# Patient Record
Sex: MALE | Race: WHITE | NOT HISPANIC OR LATINO | Employment: OTHER | URBAN - METROPOLITAN AREA
[De-identification: names, ages, dates, MRNs, and addresses within clinical notes are randomized per-mention and may not be internally consistent; named-entity substitution may affect disease eponyms.]

---

## 2023-02-24 ENCOUNTER — HOSPITAL ENCOUNTER (INPATIENT)
Facility: MEDICAL CENTER | Age: 68
LOS: 3 days | DRG: 247 | End: 2023-02-27
Attending: INTERNAL MEDICINE | Admitting: STUDENT IN AN ORGANIZED HEALTH CARE EDUCATION/TRAINING PROGRAM
Payer: MEDICARE

## 2023-02-24 ENCOUNTER — HOSPITAL ENCOUNTER (EMERGENCY)
Facility: MEDICAL CENTER | Age: 68
End: 2023-02-24
Attending: EMERGENCY MEDICINE
Payer: MEDICARE

## 2023-02-24 ENCOUNTER — APPOINTMENT (OUTPATIENT)
Dept: RADIOLOGY | Facility: MEDICAL CENTER | Age: 68
End: 2023-02-24
Attending: EMERGENCY MEDICINE
Payer: MEDICARE

## 2023-02-24 VITALS
SYSTOLIC BLOOD PRESSURE: 136 MMHG | BODY MASS INDEX: 31.18 KG/M2 | TEMPERATURE: 97.7 F | HEART RATE: 66 BPM | HEIGHT: 70 IN | OXYGEN SATURATION: 93 % | WEIGHT: 217.81 LBS | RESPIRATION RATE: 18 BRPM | DIASTOLIC BLOOD PRESSURE: 79 MMHG

## 2023-02-24 DIAGNOSIS — I21.4 NSTEMI (NON-ST ELEVATED MYOCARDIAL INFARCTION) (HCC): ICD-10-CM

## 2023-02-24 PROBLEM — I24.9 ACS (ACUTE CORONARY SYNDROME) (HCC): Status: ACTIVE | Noted: 2023-02-24

## 2023-02-24 LAB
ALBUMIN SERPL BCP-MCNC: 4.6 G/DL (ref 3.2–4.9)
ALBUMIN/GLOB SERPL: 1.8 G/DL
ALP SERPL-CCNC: 51 U/L (ref 30–99)
ALT SERPL-CCNC: 35 U/L (ref 2–50)
ANION GAP SERPL CALC-SCNC: 13 MMOL/L (ref 7–16)
APTT PPP: 35.7 SEC (ref 24.7–36)
AST SERPL-CCNC: 33 U/L (ref 12–45)
BASOPHILS # BLD AUTO: 0.6 % (ref 0–1.8)
BASOPHILS # BLD: 0.05 K/UL (ref 0–0.12)
BILIRUB SERPL-MCNC: 0.7 MG/DL (ref 0.1–1.5)
BUN SERPL-MCNC: 22 MG/DL (ref 8–22)
CALCIUM ALBUM COR SERPL-MCNC: 8.9 MG/DL (ref 8.5–10.5)
CALCIUM SERPL-MCNC: 9.4 MG/DL (ref 8.4–10.2)
CHLORIDE SERPL-SCNC: 101 MMOL/L (ref 96–112)
CO2 SERPL-SCNC: 25 MMOL/L (ref 20–33)
CREAT SERPL-MCNC: 0.93 MG/DL (ref 0.5–1.4)
D DIMER PPP IA.FEU-MCNC: <0.27 UG/ML (FEU) (ref 0–0.5)
EKG IMPRESSION: NORMAL
EOSINOPHIL # BLD AUTO: 0.03 K/UL (ref 0–0.51)
EOSINOPHIL NFR BLD: 0.4 % (ref 0–6.9)
ERYTHROCYTE [DISTWIDTH] IN BLOOD BY AUTOMATED COUNT: 42.6 FL (ref 35.9–50)
GFR SERPLBLD CREATININE-BSD FMLA CKD-EPI: 89 ML/MIN/1.73 M 2
GLOBULIN SER CALC-MCNC: 2.6 G/DL (ref 1.9–3.5)
GLUCOSE SERPL-MCNC: 115 MG/DL (ref 65–99)
HCT VFR BLD AUTO: 47.2 % (ref 42–52)
HGB BLD-MCNC: 16.5 G/DL (ref 14–18)
IMM GRANULOCYTES # BLD AUTO: 0.02 K/UL (ref 0–0.11)
IMM GRANULOCYTES NFR BLD AUTO: 0.2 % (ref 0–0.9)
INR PPP: 2.01 (ref 0.87–1.13)
LACTATE SERPL-SCNC: 1.3 MMOL/L (ref 0.5–2)
LYMPHOCYTES # BLD AUTO: 1.82 K/UL (ref 1–4.8)
LYMPHOCYTES NFR BLD: 21.4 % (ref 22–41)
MCH RBC QN AUTO: 32.4 PG (ref 27–33)
MCHC RBC AUTO-ENTMCNC: 35 G/DL (ref 33.7–35.3)
MCV RBC AUTO: 92.5 FL (ref 81.4–97.8)
MONOCYTES # BLD AUTO: 0.46 K/UL (ref 0–0.85)
MONOCYTES NFR BLD AUTO: 5.4 % (ref 0–13.4)
NEUTROPHILS # BLD AUTO: 6.11 K/UL (ref 1.82–7.42)
NEUTROPHILS NFR BLD: 72 % (ref 44–72)
NRBC # BLD AUTO: 0 K/UL
NRBC BLD-RTO: 0 /100 WBC
PLATELET # BLD AUTO: 213 K/UL (ref 164–446)
PMV BLD AUTO: 11.4 FL (ref 9–12.9)
POTASSIUM SERPL-SCNC: 4 MMOL/L (ref 3.6–5.5)
PROT SERPL-MCNC: 7.2 G/DL (ref 6–8.2)
PROTHROMBIN TIME: 22.3 SEC (ref 12–14.6)
RBC # BLD AUTO: 5.1 M/UL (ref 4.7–6.1)
SODIUM SERPL-SCNC: 139 MMOL/L (ref 135–145)
TROPONIN T SERPL-MCNC: 150 NG/L (ref 6–19)
WBC # BLD AUTO: 8.5 K/UL (ref 4.8–10.8)

## 2023-02-24 PROCEDURE — 93005 ELECTROCARDIOGRAM TRACING: CPT | Performed by: EMERGENCY MEDICINE

## 2023-02-24 PROCEDURE — 700102 HCHG RX REV CODE 250 W/ 637 OVERRIDE(OP): Performed by: EMERGENCY MEDICINE

## 2023-02-24 PROCEDURE — 85610 PROTHROMBIN TIME: CPT

## 2023-02-24 PROCEDURE — 770020 HCHG ROOM/CARE - TELE (206)

## 2023-02-24 PROCEDURE — A9270 NON-COVERED ITEM OR SERVICE: HCPCS | Performed by: EMERGENCY MEDICINE

## 2023-02-24 PROCEDURE — 99285 EMERGENCY DEPT VISIT HI MDM: CPT

## 2023-02-24 PROCEDURE — 85379 FIBRIN DEGRADATION QUANT: CPT

## 2023-02-24 PROCEDURE — 85025 COMPLETE CBC W/AUTO DIFF WBC: CPT

## 2023-02-24 PROCEDURE — 85730 THROMBOPLASTIN TIME PARTIAL: CPT

## 2023-02-24 PROCEDURE — 71045 X-RAY EXAM CHEST 1 VIEW: CPT

## 2023-02-24 PROCEDURE — 83605 ASSAY OF LACTIC ACID: CPT

## 2023-02-24 PROCEDURE — 80053 COMPREHEN METABOLIC PANEL: CPT

## 2023-02-24 PROCEDURE — 84484 ASSAY OF TROPONIN QUANT: CPT

## 2023-02-24 PROCEDURE — 36415 COLL VENOUS BLD VENIPUNCTURE: CPT

## 2023-02-24 PROCEDURE — 93005 ELECTROCARDIOGRAM TRACING: CPT

## 2023-02-24 RX ORDER — LISINOPRIL AND HYDROCHLOROTHIAZIDE 20; 12.5 MG/1; MG/1
1 TABLET ORAL DAILY
Status: ON HOLD | COMMUNITY
End: 2023-02-27

## 2023-02-24 RX ORDER — ASPIRIN 81 MG/1
324 TABLET, CHEWABLE ORAL ONCE
Status: COMPLETED | OUTPATIENT
Start: 2023-02-24 | End: 2023-02-24

## 2023-02-24 RX ADMIN — ASPIRIN 81 MG 324 MG: 81 TABLET ORAL at 19:06

## 2023-02-24 ASSESSMENT — COGNITIVE AND FUNCTIONAL STATUS - GENERAL
DAILY ACTIVITIY SCORE: 24
SUGGESTED CMS G CODE MODIFIER DAILY ACTIVITY: CH
MOBILITY SCORE: 24
SUGGESTED CMS G CODE MODIFIER MOBILITY: CH

## 2023-02-24 ASSESSMENT — LIFESTYLE VARIABLES
EVER FELT BAD OR GUILTY ABOUT YOUR DRINKING: NO
DOES PATIENT WANT TO STOP DRINKING: NO
HOW MANY TIMES IN THE PAST YEAR HAVE YOU HAD 5 OR MORE DRINKS IN A DAY: 0
ALCOHOL_USE: NO
TOTAL SCORE: 0
AVERAGE NUMBER OF DAYS PER WEEK YOU HAVE A DRINK CONTAINING ALCOHOL: 1
TOTAL SCORE: 0
EVER HAD A DRINK FIRST THING IN THE MORNING TO STEADY YOUR NERVES TO GET RID OF A HANGOVER: NO
CONSUMPTION TOTAL: NEGATIVE
TOTAL SCORE: 0
HAVE YOU EVER FELT YOU SHOULD CUT DOWN ON YOUR DRINKING: NO
HAVE PEOPLE ANNOYED YOU BY CRITICIZING YOUR DRINKING: NO
ON A TYPICAL DAY WHEN YOU DRINK ALCOHOL HOW MANY DRINKS DO YOU HAVE: 1

## 2023-02-24 ASSESSMENT — PATIENT HEALTH QUESTIONNAIRE - PHQ9
2. FEELING DOWN, DEPRESSED, IRRITABLE, OR HOPELESS: NOT AT ALL
SUM OF ALL RESPONSES TO PHQ9 QUESTIONS 1 AND 2: 0
1. LITTLE INTEREST OR PLEASURE IN DOING THINGS: NOT AT ALL

## 2023-02-24 ASSESSMENT — FIBROSIS 4 INDEX
FIB4 SCORE: 1.81
FIB4 SCORE: 1.81

## 2023-02-25 ENCOUNTER — APPOINTMENT (OUTPATIENT)
Dept: CARDIOLOGY | Facility: MEDICAL CENTER | Age: 68
DRG: 247 | End: 2023-02-25
Attending: NURSE PRACTITIONER
Payer: MEDICARE

## 2023-02-25 ENCOUNTER — APPOINTMENT (OUTPATIENT)
Dept: CARDIOLOGY | Facility: MEDICAL CENTER | Age: 68
DRG: 247 | End: 2023-02-25
Attending: INTERNAL MEDICINE
Payer: MEDICARE

## 2023-02-25 PROBLEM — I10 PRIMARY HYPERTENSION: Status: ACTIVE | Noted: 2023-02-25

## 2023-02-25 PROBLEM — Z86.711 HISTORY OF PULMONARY EMBOLISM: Status: ACTIVE | Noted: 2023-02-25

## 2023-02-25 PROBLEM — I21.4 NON-STEMI (NON-ST ELEVATED MYOCARDIAL INFARCTION) (HCC): Status: ACTIVE | Noted: 2023-02-24

## 2023-02-25 PROBLEM — I21.4 NSTEMI (NON-ST ELEVATED MYOCARDIAL INFARCTION) (HCC): Status: ACTIVE | Noted: 2023-02-25

## 2023-02-25 PROBLEM — C20 RECTAL CARCINOMA (HCC): Status: ACTIVE | Noted: 2023-02-25

## 2023-02-25 PROBLEM — E66.01 MORBID OBESITY (HCC): Status: ACTIVE | Noted: 2023-02-25

## 2023-02-25 PROBLEM — E11.9 TYPE 2 DIABETES MELLITUS (HCC): Status: ACTIVE | Noted: 2023-02-25

## 2023-02-25 PROBLEM — G47.33 OSA (OBSTRUCTIVE SLEEP APNEA): Status: ACTIVE | Noted: 2023-02-25

## 2023-02-25 LAB
ACT BLD: 239 SEC (ref 74–137)
ACT BLD: 251 SEC (ref 74–137)
ACT BLD: 257 SEC (ref 74–137)
APTT PPP: 32.4 SEC (ref 24.7–36)
EKG IMPRESSION: NORMAL
INR PPP: 1.32 (ref 0.87–1.13)
LV EJECT FRACT  99904: 60
LV EJECT FRACT MOD 2C 99903: 63.59
LV EJECT FRACT MOD 4C 99902: 54.66
LV EJECT FRACT MOD BP 99901: 58.75
PROTHROMBIN TIME: 16.2 SEC (ref 12–14.6)
TROPONIN T SERPL-MCNC: 442 NG/L (ref 6–19)
UFH PPP CHRO-ACNC: 1.05 IU/ML

## 2023-02-25 PROCEDURE — 93308 TTE F-UP OR LMTD: CPT

## 2023-02-25 PROCEDURE — 700105 HCHG RX REV CODE 258: Performed by: INTERNAL MEDICINE

## 2023-02-25 PROCEDURE — 85520 HEPARIN ASSAY: CPT

## 2023-02-25 PROCEDURE — 93005 ELECTROCARDIOGRAM TRACING: CPT | Performed by: STUDENT IN AN ORGANIZED HEALTH CARE EDUCATION/TRAINING PROGRAM

## 2023-02-25 PROCEDURE — 92978 ENDOLUMINL IVUS OCT C 1ST: CPT | Mod: 26,LD | Performed by: INTERNAL MEDICINE

## 2023-02-25 PROCEDURE — 700102 HCHG RX REV CODE 250 W/ 637 OVERRIDE(OP)

## 2023-02-25 PROCEDURE — 700101 HCHG RX REV CODE 250

## 2023-02-25 PROCEDURE — 85347 COAGULATION TIME ACTIVATED: CPT | Mod: 91

## 2023-02-25 PROCEDURE — A9270 NON-COVERED ITEM OR SERVICE: HCPCS | Performed by: STUDENT IN AN ORGANIZED HEALTH CARE EDUCATION/TRAINING PROGRAM

## 2023-02-25 PROCEDURE — B2111ZZ FLUOROSCOPY OF MULTIPLE CORONARY ARTERIES USING LOW OSMOLAR CONTRAST: ICD-10-PCS | Performed by: INTERNAL MEDICINE

## 2023-02-25 PROCEDURE — 93306 TTE W/DOPPLER COMPLETE: CPT | Mod: 26 | Performed by: INTERNAL MEDICINE

## 2023-02-25 PROCEDURE — 99153 MOD SED SAME PHYS/QHP EA: CPT

## 2023-02-25 PROCEDURE — 700105 HCHG RX REV CODE 258: Performed by: NURSE PRACTITIONER

## 2023-02-25 PROCEDURE — 700111 HCHG RX REV CODE 636 W/ 250 OVERRIDE (IP)

## 2023-02-25 PROCEDURE — 93306 TTE W/DOPPLER COMPLETE: CPT

## 2023-02-25 PROCEDURE — 93308 TTE F-UP OR LMTD: CPT | Mod: 26,59 | Performed by: INTERNAL MEDICINE

## 2023-02-25 PROCEDURE — 92928 PRQ TCAT PLMT NTRAC ST 1 LES: CPT | Mod: LD | Performed by: INTERNAL MEDICINE

## 2023-02-25 PROCEDURE — 84484 ASSAY OF TROPONIN QUANT: CPT

## 2023-02-25 PROCEDURE — 85730 THROMBOPLASTIN TIME PARTIAL: CPT

## 2023-02-25 PROCEDURE — 700102 HCHG RX REV CODE 250 W/ 637 OVERRIDE(OP): Performed by: STUDENT IN AN ORGANIZED HEALTH CARE EDUCATION/TRAINING PROGRAM

## 2023-02-25 PROCEDURE — A9270 NON-COVERED ITEM OR SERVICE: HCPCS

## 2023-02-25 PROCEDURE — 99223 1ST HOSP IP/OBS HIGH 75: CPT | Mod: 25 | Performed by: INTERNAL MEDICINE

## 2023-02-25 PROCEDURE — 99152 MOD SED SAME PHYS/QHP 5/>YRS: CPT | Performed by: INTERNAL MEDICINE

## 2023-02-25 PROCEDURE — 93308 TTE F-UP OR LMTD: CPT | Mod: 26,77,59 | Performed by: INTERNAL MEDICINE

## 2023-02-25 PROCEDURE — 770022 HCHG ROOM/CARE - ICU (200)

## 2023-02-25 PROCEDURE — 99291 CRITICAL CARE FIRST HOUR: CPT | Performed by: INTERNAL MEDICINE

## 2023-02-25 PROCEDURE — 99222 1ST HOSP IP/OBS MODERATE 55: CPT | Mod: AI | Performed by: STUDENT IN AN ORGANIZED HEALTH CARE EDUCATION/TRAINING PROGRAM

## 2023-02-25 PROCEDURE — 027034Z DILATION OF CORONARY ARTERY, ONE ARTERY WITH DRUG-ELUTING INTRALUMINAL DEVICE, PERCUTANEOUS APPROACH: ICD-10-PCS | Performed by: INTERNAL MEDICINE

## 2023-02-25 PROCEDURE — 36415 COLL VENOUS BLD VENIPUNCTURE: CPT

## 2023-02-25 PROCEDURE — 700117 HCHG RX CONTRAST REV CODE 255: Performed by: INTERNAL MEDICINE

## 2023-02-25 PROCEDURE — 4A023N7 MEASUREMENT OF CARDIAC SAMPLING AND PRESSURE, LEFT HEART, PERCUTANEOUS APPROACH: ICD-10-PCS | Performed by: INTERNAL MEDICINE

## 2023-02-25 PROCEDURE — 85610 PROTHROMBIN TIME: CPT

## 2023-02-25 PROCEDURE — 93458 L HRT ARTERY/VENTRICLE ANGIO: CPT | Mod: 26,59 | Performed by: INTERNAL MEDICINE

## 2023-02-25 PROCEDURE — 93010 ELECTROCARDIOGRAM REPORT: CPT | Mod: 59 | Performed by: INTERNAL MEDICINE

## 2023-02-25 PROCEDURE — 93005 ELECTROCARDIOGRAM TRACING: CPT | Performed by: INTERNAL MEDICINE

## 2023-02-25 RX ORDER — LIDOCAINE HYDROCHLORIDE 20 MG/ML
INJECTION, SOLUTION INFILTRATION; PERINEURAL
Status: COMPLETED
Start: 2023-02-25 | End: 2023-02-25

## 2023-02-25 RX ORDER — HEPARIN SODIUM 1000 [USP'U]/ML
INJECTION, SOLUTION INTRAVENOUS; SUBCUTANEOUS
Status: COMPLETED
Start: 2023-02-25 | End: 2023-02-25

## 2023-02-25 RX ORDER — ASPIRIN 81 MG/1
81 TABLET, CHEWABLE ORAL DAILY
Status: DISCONTINUED | OUTPATIENT
Start: 2023-02-25 | End: 2023-02-26

## 2023-02-25 RX ORDER — ATORVASTATIN CALCIUM 80 MG/1
80 TABLET, FILM COATED ORAL EVERY EVENING
Status: DISCONTINUED | OUTPATIENT
Start: 2023-02-25 | End: 2023-02-27 | Stop reason: HOSPADM

## 2023-02-25 RX ORDER — SODIUM CHLORIDE 9 MG/ML
INJECTION, SOLUTION INTRAVENOUS CONTINUOUS
Status: ACTIVE | OUTPATIENT
Start: 2023-02-25 | End: 2023-02-26

## 2023-02-25 RX ORDER — SODIUM CHLORIDE 9 MG/ML
INJECTION, SOLUTION INTRAVENOUS CONTINUOUS
Status: DISCONTINUED | OUTPATIENT
Start: 2023-02-25 | End: 2023-02-25

## 2023-02-25 RX ORDER — ACETAMINOPHEN 325 MG/1
650 TABLET ORAL EVERY 6 HOURS PRN
Status: DISCONTINUED | OUTPATIENT
Start: 2023-02-25 | End: 2023-02-27 | Stop reason: HOSPADM

## 2023-02-25 RX ORDER — MIDAZOLAM HYDROCHLORIDE 1 MG/ML
INJECTION INTRAMUSCULAR; INTRAVENOUS
Status: COMPLETED
Start: 2023-02-25 | End: 2023-02-25

## 2023-02-25 RX ORDER — PRASUGREL 10 MG/1
TABLET, FILM COATED ORAL
Status: COMPLETED
Start: 2023-02-25 | End: 2023-02-25

## 2023-02-25 RX ORDER — PRASUGREL 10 MG/1
60 TABLET, FILM COATED ORAL ONCE
Status: DISCONTINUED | OUTPATIENT
Start: 2023-02-25 | End: 2023-02-25

## 2023-02-25 RX ORDER — VERAPAMIL HYDROCHLORIDE 2.5 MG/ML
INJECTION, SOLUTION INTRAVENOUS
Status: COMPLETED
Start: 2023-02-25 | End: 2023-02-25

## 2023-02-25 RX ORDER — PRASUGREL 10 MG/1
10 TABLET, FILM COATED ORAL DAILY
Status: DISCONTINUED | OUTPATIENT
Start: 2023-02-26 | End: 2023-02-26

## 2023-02-25 RX ORDER — NOREPINEPHRINE BITARTRATE 1 MG/ML
INJECTION, SOLUTION INTRAVENOUS
Status: DISPENSED
Start: 2023-02-25 | End: 2023-02-26

## 2023-02-25 RX ORDER — HEPARIN SODIUM 200 [USP'U]/100ML
INJECTION, SOLUTION INTRAVENOUS
Status: COMPLETED
Start: 2023-02-25 | End: 2023-02-25

## 2023-02-25 RX ADMIN — HEPARIN SODIUM 2000 UNITS: 200 INJECTION, SOLUTION INTRAVENOUS at 15:02

## 2023-02-25 RX ADMIN — HEPARIN SODIUM: 1000 INJECTION, SOLUTION INTRAVENOUS; SUBCUTANEOUS at 14:40

## 2023-02-25 RX ADMIN — SODIUM CHLORIDE: 9 INJECTION, SOLUTION INTRAVENOUS at 08:29

## 2023-02-25 RX ADMIN — METOPROLOL TARTRATE 25 MG: 25 TABLET, FILM COATED ORAL at 18:00

## 2023-02-25 RX ADMIN — HEPARIN SODIUM: 1000 INJECTION, SOLUTION INTRAVENOUS; SUBCUTANEOUS at 15:12

## 2023-02-25 RX ADMIN — SODIUM CHLORIDE: 9 INJECTION, SOLUTION INTRAVENOUS at 17:08

## 2023-02-25 RX ADMIN — FENTANYL CITRATE 100 MCG: 50 INJECTION, SOLUTION INTRAMUSCULAR; INTRAVENOUS at 15:50

## 2023-02-25 RX ADMIN — ASPIRIN 81 MG 81 MG: 81 TABLET ORAL at 05:13

## 2023-02-25 RX ADMIN — MIDAZOLAM HYDROCHLORIDE 1.5 MG: 1 INJECTION, SOLUTION INTRAMUSCULAR; INTRAVENOUS at 15:48

## 2023-02-25 RX ADMIN — IOHEXOL 140 ML: 350 INJECTION, SOLUTION INTRAVENOUS at 14:30

## 2023-02-25 RX ADMIN — VERAPAMIL HYDROCHLORIDE 2.5 MG: 2.5 INJECTION, SOLUTION INTRAVENOUS at 14:40

## 2023-02-25 RX ADMIN — NITROGLYCERIN 10 ML: 20 INJECTION INTRAVENOUS at 14:40

## 2023-02-25 RX ADMIN — ATORVASTATIN CALCIUM 80 MG: 80 TABLET, FILM COATED ORAL at 17:04

## 2023-02-25 RX ADMIN — PRASUGREL 60 MG: 10 TABLET, FILM COATED ORAL at 15:51

## 2023-02-25 RX ADMIN — LIDOCAINE HYDROCHLORIDE: 20 INJECTION, SOLUTION INFILTRATION; PERINEURAL at 14:40

## 2023-02-25 ASSESSMENT — ENCOUNTER SYMPTOMS
DIAPHORESIS: 0
EYE DISCHARGE: 0
NERVOUS/ANXIOUS: 0
NEUROLOGICAL NEGATIVE: 1
CLAUDICATION: 0
EYE PAIN: 0
STRIDOR: 0
BRUISES/BLEEDS EASILY: 0
LOSS OF CONSCIOUSNESS: 0
DIZZINESS: 0
FEVER: 0
RESPIRATORY NEGATIVE: 1
EYES NEGATIVE: 1
CARDIOVASCULAR NEGATIVE: 1
SEIZURES: 0
PSYCHIATRIC NEGATIVE: 1
SINUS PAIN: 0
HALLUCINATIONS: 0
MUSCULOSKELETAL NEGATIVE: 1
VOMITING: 0
HEADACHES: 0
ABDOMINAL PAIN: 0
GASTROINTESTINAL NEGATIVE: 1
EYE REDNESS: 0
SORE THROAT: 0
NAUSEA: 0
MYALGIAS: 0
PALPITATIONS: 0
WEAKNESS: 0
CONSTITUTIONAL NEGATIVE: 1
SHORTNESS OF BREATH: 0
HEMOPTYSIS: 0
CHILLS: 0
NECK PAIN: 0
COUGH: 0

## 2023-02-25 ASSESSMENT — COGNITIVE AND FUNCTIONAL STATUS - GENERAL
WALKING IN HOSPITAL ROOM: A LITTLE
SUGGESTED CMS G CODE MODIFIER MOBILITY: CJ
MOBILITY SCORE: 21
SUGGESTED CMS G CODE MODIFIER DAILY ACTIVITY: CH
STANDING UP FROM CHAIR USING ARMS: A LITTLE
DAILY ACTIVITIY SCORE: 24
CLIMB 3 TO 5 STEPS WITH RAILING: A LITTLE

## 2023-02-25 ASSESSMENT — PAIN DESCRIPTION - PAIN TYPE
TYPE: ACUTE PAIN

## 2023-02-25 ASSESSMENT — FIBROSIS 4 INDEX: FIB4 SCORE: 1.81

## 2023-02-25 NOTE — ED NOTES
Report given to LINDSEY Schneider and TIFFANY at bedside. Patient NAP and with no needs verbalized at this time.

## 2023-02-25 NOTE — ED TRIAGE NOTES
"Chief Complaint   Patient presents with    Chest Pain     66 yo male ambulates to triage with family with reports of chest pain onset at 1300 today.  Patient reports pain is in center of chest to left side and into throat.  Denies N/V or SOB     BP (!) 146/90   Pulse 69   Temp 36.4 °C (97.6 °F) (Temporal)   Resp 18   Ht 1.778 m (5' 10\")   Wt 98.8 kg (217 lb 13 oz)   SpO2 99%   BMI 31.25 kg/m²    "

## 2023-02-25 NOTE — PROGRESS NOTES
4 Eyes Skin Assessment Completed by Jennie Cedeno, RN and Duncan Santana, ACT.    Head WDL  Ears WDL  Nose WDL  Mouth WDL  Neck WDL  Breast/Chest WDL  Shoulder Blades WDL  Spine WDL  (R) Arm/Elbow/Hand WDL  (L) Arm/Elbow/Hand WDL  Abdomen WDL  Groin WDL  Scrotum/Coccyx/Buttocks WDL  (R) Leg WDL  (L) Leg WDL  (R) Heel/Foot/Toe WDL  (L) Heel/Foot/Toe WDL          Devices In Places Tele Box, Blood Pressure Cuff, and Pulse Ox      Interventions In Place N/A    Possible Skin Injury No    Pictures Uploaded Into Epic N/A  Wound Consult Placed N/A  RN Wound Prevention Protocol Ordered No

## 2023-02-25 NOTE — CARE PLAN
The patient is Stable - Low risk of patient condition declining or worsening    Shift Goals  Clinical Goals: monitor labs, vitals, prepare for heart cath  Patient Goals: prepare for heart cath    Progress made toward(s) clinical / shift goals:    Problem: Optimal Care for the Acute Coronary Syndrome Patient  Goal: Optimal Care for the Acute Coronary Syndrome Patient  Outcome: Progressing     Problem: Hemodynamics  Goal: Patient's hemodynamics, fluid balance and neurologic status will be stable or improve  Outcome: Progressing     Problem: Optimal Care for the Acute Coronary Syndrome Patient  Goal: Optimal Care for the Acute Coronary Syndrome Patient  Outcome: Progressing       Patient is not progressing towards the following goals:

## 2023-02-25 NOTE — PROGRESS NOTES
[facility]PITAL[facility] TRIAGE OFFICER DIRECT ADMISSION REPORT  Transferring facility: [facility]  Transferring physician: Dr Meredith  Transferring facility/physician contact number:   Chief complaint: Chest pain  Pertinent history & patient course: 67-year-old male with past medical history of recurrent pulmonary embolism on Xarelto, hypertension, type 2 diabetes, who presented at outside facility ER with complaints of transient chest pain that resolved that started at 1 PM, possibly after exertion (shoveled snow).  No chest pain in ER.  Troponin noted to be elevated at 150.  EKG showed RBBB, no EKG to compare, as patient was followed at [facility].  PE seems to be unlikely as symptoms resolved and vital stable.  Dr. Meredith/ERP spoke to Dr. Tellez who recommended transfer to [facility] hospital for possible coronary angiography in the next 1 to 2 days.  Recommended aspirin, which was given, nitroglycerin as needed  Pertinent imaging & lab results:   Code Status: full per transferring provider, I personally verified with the transferring provider patient's code status and the transferring provider has confirmed this with the patient.  Further work up or recommendations per triage officer prior to transfer:   Consultants called prior to transfer and pertinent input from consultants: card  Patient accepted for transfer: Yes  Consultants to be called upon arrival: Consult cardiology tomorrow, or today if condition changed.  Admission status: Inpatient.   Floor requested: tele  If ICU transfer, name of intensivist case discussed with and pertinent input from critical care: n/a    Please inform the triage officer upon arrival of the patient to [facility] for assignment of a hospitalist to perform admission.     For any question or concerns regarding the care of this patient, please reach out to the assigned hospitalist.

## 2023-02-25 NOTE — ASSESSMENT & PLAN NOTE
Now s/p left heart cath 2/25 with drug-eluting stent placed to the LAD.  Procedure complicated by coronary artery perforation which was treated with a second stent.  Dual antiplatelet therapy  High-dose statin  Beta-blocker  Lifestyle counseling  Cardiology following  Post cath transthoracic echo shows preserved ejection fraction with no significant structural disease  Cardiac rehab

## 2023-02-25 NOTE — ASSESSMENT & PLAN NOTE
Rivaroxaban was held briefly as result of the perforation which occurred in Cath Lab, but he has been restarted on it now.  Repeat echo does not demonstrate any signs of further bleeding.

## 2023-02-25 NOTE — H&P
Hospital Medicine History & Physical Note    Date of Service  2/25/2023    Primary Care Physician  Pcp Pt States None    Consultants  Cardiology    Code Status  Full Code    Chief Complaint  NSTEMI    History of Presenting Illness  Juanito Rodriguez is a 67 y.o. male who presented 2/24/2023 with chest pain that started yesterday afternoon.  Patient has a history of pulmonary embolism for which he takes Xarelto, diabetes, hypertension.  Patient was sitting in time yesterday when he suddenly felt a dull chest pain that slowly became worse.  He took a Xarelto when this occurred and then went to the ED for evaluation.  Denies ever having cardiac catheterization in the past denies drug smoke illicit drug use..    In ED, patient found to have normal vital signs.  Troponin 150.  Chest x-ray negative for acute cardiopulmonary process.  EKG showing normal sinus rhythm with nonspecific T wave changes in the inferior leads and right bundle branch block.  Loaded with aspirin and transferred to [facility] for further care.    Patient seen at bedside, is comfortable at bedside and has no complaints at this time.  Chest pain-free.    I discussed the plan of care with patient.    Review of Systems  Review of Systems   Constitutional: Negative.    HENT: Negative.     Eyes: Negative.    Respiratory: Negative.     Cardiovascular:  Positive for chest pain.   Gastrointestinal: Negative.    Genitourinary: Negative.    Musculoskeletal: Negative.    Skin: Negative.    Neurological: Negative.    Endo/Heme/Allergies: Negative.    Psychiatric/Behavioral: Negative.       Past Medical History   has a past medical history of Blood clotting disorder (HCC), Cancer (HCC), Diabetes (HCC), High cholesterol, and Hypertension.    Surgical History  No pertinent surgical history      Family History   Family history reviewed with patient. There is no family history that is pertinent to the chief complaint.     Social History   reports that  he quit smoking about 43 years ago. His smoking use included pipe. He has never used smokeless tobacco. He reports current alcohol use. He reports that he does not use drugs.    Allergies  No Known Allergies    Medications  Prior to Admission Medications   Prescriptions Last Dose Informant Patient Reported? Taking?   lisinopril-hydrochlorothiazide (PRINZIDE) 20-12.5 MG per tablet 2/24/2023 at 1000  Yes Yes   Sig: Take 1 Tablet by mouth every day.   metFORMIN (GLUCOPHAGE) 500 MG Tab 2/24/2023 at 1000  Yes Yes   Sig: Take 500 mg by mouth 2 times a day with meals.   rivaroxaban (XARELTO) 20 MG Tab tablet 2/24/2023 at 1000  Yes Yes   Sig: Take 20 mg by mouth with dinner. Indications: Blockage of Blood Vessel to Lung by a Particle      Facility-Administered Medications: None       Physical Exam  Temp:  [36.4 °C (97.6 °F)-36.5 °C (97.7 °F)] 36.5 °C (97.7 °F)  Pulse:  [66-94] 66  Resp:  [18-27] 18  BP: (122-146)/(79-90) 136/79  SpO2:  [93 %-99 %] 93 %  Blood Pressure : 136/79   Temperature: 36.5 °C (97.7 °F)   Pulse: 66   Respiration: 18   Pulse Oximetry: 93 %       Physical Exam  Constitutional:       Appearance: Normal appearance. He is obese.   HENT:      Head: Normocephalic.      Nose: Nose normal.      Mouth/Throat:      Mouth: Mucous membranes are moist.   Eyes:      Pupils: Pupils are equal, round, and reactive to light.   Cardiovascular:      Rate and Rhythm: Normal rate and regular rhythm.      Pulses: Normal pulses.   Pulmonary:      Effort: Pulmonary effort is normal.      Breath sounds: Normal breath sounds.   Abdominal:      General: Abdomen is flat. Bowel sounds are normal.      Palpations: Abdomen is soft.   Musculoskeletal:         General: Normal range of motion.      Cervical back: Neck supple.   Skin:     General: Skin is warm.   Neurological:      General: No focal deficit present.      Mental Status: He is alert and oriented to person, place, and time. Mental status is at baseline.   Psychiatric:          Mood and Affect: Mood normal.         Behavior: Behavior normal.         Thought Content: Thought content normal.         Judgment: Judgment normal.       Laboratory:  Recent Labs     02/24/23  1707   WBC 8.5   RBC 5.10   HEMOGLOBIN 16.5   HEMATOCRIT 47.2   MCV 92.5   MCH 32.4   MCHC 35.0   RDW 42.6   PLATELETCT 213   MPV 11.4     Recent Labs     02/24/23  1707   SODIUM 139   POTASSIUM 4.0   CHLORIDE 101   CO2 25   GLUCOSE 115*   BUN 22   CREATININE 0.93   CALCIUM 9.4     Recent Labs     02/24/23  1707   ALTSGPT 35   ASTSGOT 33   ALKPHOSPHAT 51   TBILIRUBIN 0.7   GLUCOSE 115*     Recent Labs     02/24/23  1707   APTT 35.7   INR 2.01*     No results for input(s): NTPROBNP in the last 72 hours.      Recent Labs     02/24/23  1707   TROPONINT 150*       Imaging:  No orders to display       X-Ray:  I have personally reviewed the images and compared with prior images.    Assessment/Plan:  Justification for Admission Status  I anticipate this patient will require at least two midnights for appropriate medical management, necessitating inpatient admission because patient has chest pain with elevated troponin        * ACS (acute coronary syndrome) (HCC)- (present on admission)  Assessment & Plan  Loaded with aspirin at outside facility ER, continue asa, start lipitor and metoprolol  Trend troponin  Took Xarelto yesterday at 130 pm, no need for anticoagulation for now  Cardiology consulted at outside facility, will leave n.p.o. for possible cath    History of pulmonary embolism  Assessment & Plan  Took xarelto yesterday at 130 pm. Hold for now due to possible cath in am    Morbid obesity (HCC)  Assessment & Plan  Will need counseling when clinically appropriate        VTE prophylaxis: therapeutic anticoagulation with xarelto

## 2023-02-25 NOTE — PROGRESS NOTES
Bedside report received from NOC RN. Assumed care of pt. Pt awake, laying in bed. A/Ox4, VSS. No concerns, complaints or distress. Pt educated to call before getting out of bed. POC reviewed and white board updated. Tele box on. SR 60 on the monitor. Call light in reach. Bed locked in lowest position with 2 upper bed rails up. Bed alarm on.

## 2023-02-25 NOTE — PROGRESS NOTES
Patient seen and examined, afebrile, admitted after midnight for NSTEMI   Cardiology has been consulted and plan is for patient to have a cardiac cath today appreciate rec   For more info please refer to Dr. Andreea GRAFF.

## 2023-02-25 NOTE — PROGRESS NOTES
Patient transferred from [facility]. Patient A&O x 4, on room air, VSS, call light is in reach. Patient is in no distress.

## 2023-02-25 NOTE — ED PROVIDER NOTES
ED Provider Note    CHIEF COMPLAINT  Chief Complaint   Patient presents with    Chest Pain     66 yo male ambulates to triage with family with reports of chest pain onset at 1300 today.  Patient reports pain is in center of chest to left side and into throat.  Denies N/V or SOB        HPI  Juanito Rodriguez is a 67 y.o. male who presents for evaluation of chest tightness and pain which started around 1:00 PM today.  Patient was not doing anything in particular but does note that he was shoveling snow earlier this morning.  He states that the pain started in his central chest and the tightness radiated up toward his neck.  He noted no arm pain or back pain and did not have shortness of breath.  He notes he had similar chest pain when he had a PE several years ago.  He notes he is on Xarelto but forgot to take it last night.  After the chest pain started today, he did take a dose.  He notes he is on hydrochlorothiazide, lisinopril and metformin in addition to the Xarelto.  Currently he has no symptoms and states the tightness resolved after about 2 hours.  EXTERNAL RECORDS REVIEWED  None available  ROS  Constitutional: No fevers or chills  Skin: No rashes  HEENT: No sore throat, runny nose  Neck: No neck pain  Chest: Chest pain as noted  Pulm: No shortness of breath, cough, wheezing, stridor, or pain with inspiration/expiration  Gastrointestinal: No nausea, vomiting, diarrhea, constipation, bloating, melena, hematochezia or abdominal pain.  Genitourinary: No dysuria or hematuria  Musculoskeletal: No pain, swelling, or weakness  Neurologic: No sensory or focal motor changes to extremities. No confusion or disorientation.  Heme: Bruises and bleeds easily due to Xarelto use  Immuno: No hx of recurrent infections        LIMITATION TO HISTORY   Select: : None  OUTSIDE HISTORIAN(S):  Family daughter        PAST FAM HISTORY  History reviewed. No pertinent family history.    PAST MEDICAL HISTORY   has a past medical  "history of High cholesterol and Hypertension.    SOCIAL HISTORY  Social History     Tobacco Use    Smoking status: Never    Smokeless tobacco: Never   Vaping Use    Vaping Use: Not on file   Substance and Sexual Activity    Alcohol use: Yes     Comment: occassinally    Drug use: Never    Sexual activity: Not on file       SURGICAL HISTORY  patient denies any surgical history    CURRENT MEDICATIONS  Home Medications       Reviewed by Brandee Paulino R.N. (Registered Nurse) on 02/24/23 at 1621  Med List Status: Not Addressed     Medication Last Dose Status        Patient Harris Taking any Medications                            ALLERGIES  No Known Allergies    PHYSICAL EXAM  VITAL SIGNS: BP (!) 146/89   Pulse 83   Temp 36.4 °C (97.6 °F) (Temporal)   Resp 19   Ht 1.778 m (5' 10\")   Wt 98.8 kg (217 lb 13 oz)   SpO2 97%   BMI 31.25 kg/m²    Gen: Alert in no apparent distress.  HEENT: No signs of trauma, Bilateral external ears normal, Nose normal. Conjunctiva normal, Non-icteric.   Cardiovascular: Regular rate and rhythm, no murmurs.  Capillary refill less than 3 seconds to all extremities, 2+ distal pulses.  Thorax & Lungs: Normal breath sounds, No respiratory distress, No wheezing bilateral chest rise  Abdomen: Bowel sounds normal, Soft, No tenderness, No masses, No pulsatile masses. No Guarding or rebound  Skin: Warm, Dry, No erythema, No rash noted to exposed areas.   Extremities: Intact distal pulses, No edema  Neurologic: Alert , no facial droop, grossly normal coordination and strength  Psychiatric: Affect pleasant    INITIAL IMPRESSION  Patient arrives for evaluation of chest pain earlier this afternoon which has completely resolved.  It is suspicious for anginal pain although he was not exerting himself at the time.  He notes that he did not have the shortness of breath he had with his previous PE  but it is concerning that he missed his dosing last night.  PE still seems unlikely given complete resolution " of his symptoms and lack of shortness of breath or tachycardia currently.  Regardless will draw a D-dimer and if it is positive we may need to CTA his chest.  He has not had provocative testing in terms of a stress test in many years and, given his risk, and heart score, albeit prior to troponin returning, he is in the moderate risk category.  He will likely need admission for this reason alone.  LABS  Results for orders placed or performed during the hospital encounter of 02/24/23   CBC WITH DIFFERENTIAL   Result Value Ref Range    WBC 8.5 4.8 - 10.8 K/uL    RBC 5.10 4.70 - 6.10 M/uL    Hemoglobin 16.5 14.0 - 18.0 g/dL    Hematocrit 47.2 42.0 - 52.0 %    MCV 92.5 81.4 - 97.8 fL    MCH 32.4 27.0 - 33.0 pg    MCHC 35.0 33.7 - 35.3 g/dL    RDW 42.6 35.9 - 50.0 fL    Platelet Count 213 164 - 446 K/uL    MPV 11.4 9.0 - 12.9 fL    Neutrophils-Polys 72.00 44.00 - 72.00 %    Lymphocytes 21.40 (L) 22.00 - 41.00 %    Monocytes 5.40 0.00 - 13.40 %    Eosinophils 0.40 0.00 - 6.90 %    Basophils 0.60 0.00 - 1.80 %    Immature Granulocytes 0.20 0.00 - 0.90 %    Nucleated RBC 0.00 /100 WBC    Neutrophils (Absolute) 6.11 1.82 - 7.42 K/uL    Lymphs (Absolute) 1.82 1.00 - 4.80 K/uL    Monos (Absolute) 0.46 0.00 - 0.85 K/uL    Eos (Absolute) 0.03 0.00 - 0.51 K/uL    Baso (Absolute) 0.05 0.00 - 0.12 K/uL    Immature Granulocytes (abs) 0.02 0.00 - 0.11 K/uL    NRBC (Absolute) 0.00 K/uL   COMP METABOLIC PANEL   Result Value Ref Range    Sodium 139 135 - 145 mmol/L    Potassium 4.0 3.6 - 5.5 mmol/L    Chloride 101 96 - 112 mmol/L    Co2 25 20 - 33 mmol/L    Anion Gap 13.0 7.0 - 16.0    Glucose 115 (H) 65 - 99 mg/dL    Bun 22 8 - 22 mg/dL    Creatinine 0.93 0.50 - 1.40 mg/dL    Calcium 9.4 8.4 - 10.2 mg/dL    AST(SGOT) 33 12 - 45 U/L    ALT(SGPT) 35 2 - 50 U/L    Alkaline Phosphatase 51 30 - 99 U/L    Total Bilirubin 0.7 0.1 - 1.5 mg/dL    Albumin 4.6 3.2 - 4.9 g/dL    Total Protein 7.2 6.0 - 8.2 g/dL    Globulin 2.6 1.9 - 3.5 g/dL    A-G  Ratio 1.8 g/dL   TROPONIN   Result Value Ref Range    Troponin T 150 (H) 6 - 19 ng/L   LACTIC ACID   Result Value Ref Range    Lactic Acid 1.3 0.5 - 2.0 mmol/L   APTT   Result Value Ref Range    APTT 35.7 24.7 - 36.0 sec   PROTHROMBIN TIME (INR)   Result Value Ref Range    PT 22.3 (H) 12.0 - 14.6 sec    INR 2.01 (H) 0.87 - 1.13   D-DIMER   Result Value Ref Range    D-Dimer <0.27 0.00 - 0.50 ug/mL (FEU)   CORRECTED CALCIUM   Result Value Ref Range    Correct Calcium 8.9 8.5 - 10.5 mg/dL   ESTIMATED GFR   Result Value Ref Range    GFR (CKD-EPI) 89 >60 mL/min/1.73 m 2   EKG   Result Value Ref Range    Report       [facility] Emergency Dept.    Test Date:  2023  Pt Name:    LEVI QUILES             Department: [facility]  MRN:        [id number]                      [facility]  Gender:     Male                         Technician: cristiana  :        1955                   Requested By:ER TRIAGE PROTOCOL  Order #:    [id number]                    Reading MD:    Measurements  Intervals                                Axis  Rate:       70                           P:          32  ME:         189                          QRS:        -16  QRSD:       136                          T:          10  QT:         416  QTc:        449    Interpretive Statements  Sinus rhythm  Right bundle branch block  No previous ECG available for comparison       I have independently interpreted this EKG  Sinus rhythm rate of 70, intervals appear to be within normal limits, there is a right bundle branch noted.  There is no previous EKG for comparison.  There are no ST or T wave changes to suggest ischemia, no ectopy.    RADIOLOGY  DX-CHEST-PORTABLE (1 VIEW)   Final Result      1.  There is no acute cardiopulmonary process.        I have independently interpreted the diagnostic imaging associated with this visit and am waiting the final reading from the radiologist.   My preliminary interpretation is a follows:  Single view chest x-ray: No parenchymal opacities to suggest effusions or infiltrates.  No bony abnormalities.  Cardiac silhouette appears to be within normal limits.    HYDRATION: Based on the patient's presentation of Inability to take oral fluids the patient was given IV fluids. IV Hydration was used because oral hydration was not adequate alone. Upon recheck following hydration, the patient was stable.    Critical Care Note  Upon my evaluation, this patient had high probability of imminent and life-threatening deterioration due to STEMI, which required my direct attention, intervention, and personal management. I personally provided 35 minutes of critical care time exclusive of time spent on separately billable procedures. Time includes review of laboratory data, radiology results, discussion with consultants, and monitoring for potential decompensation.      COURSE & MEDICAL DECISION MAKING  Pertinent Labs & Imaging studies reviewed. (See chart for details)  6:25 PM  Patient's troponin came back at 150 indicating a likely NSTEMI earlier today.  I evaluated patient at bedside, he is symptom-free and hemodynamically stable.  He states understanding of the finding and the need for admission.  We will contact the cardiologist as it is unclear whether the patient needs to be transferred to [facility].    7 PM  Dr. Delacruz, cardiologist, returned page.  Stated understanding of the situation and agreed with aspirin, nitrates if needed, and metoprolol if tolerated.  He agreed that transfer to [facility] would likely be necessary provided the patient is stable enough for transfer.  Unclear when or if the patient will go to the catheterization lab given the caseload at [facility].     ED observation? No    I have discussed management of the patient with the following physicians and SU's: Case discussed with the cardiologist, and the transfer hospitalist.    Escalation of care considered, and ultimately not  performed:none    Barriers to care at this time, including but not limited to:  Patient is not from this area .     Decision tools and Rx drugs considered including, but not limited to :HEART Score 6      The patient will not drink alcohol nor drive with prescribed medications. The patient will return for worsening symptoms and is stable at the time of discharge. The patient verbalizes understanding and will comply.    FINAL IMPRESSION  1. NSTEMI (non-ST elevated myocardial infarction) (HCC)        Electronically signed by: Marcelino Meredith M.D., 2/24/2023 5:03 PM

## 2023-02-25 NOTE — CONSULTS
Cardiology Initial Consultation    Date of Service  2/25/2023    Referring Physician  Stiven Montes M.D.    Reason for Consultation  NSTEMI.    History of Presenting Illness  Juanito Rodriguez is a 67 y.o. male with a past medical history of diabetes mellitus, hypertension and hyperlipidemia, pulmonary embolism (11/13/16) on chronic anticoagulation therapy (Xarelto), remote tobacco abuse, no family history of coronary artery disease who presented to [facility] on 2/24/2023 for chest pain. He shoveled snow at 09:00 yesterday. He was well until 13:00 when he began to experience chest pain. He describes his chest pain to be 6/10 chest ache pressure sensation of his mid chest with radiation up his neck , lasting 2 hours days. He denies associated shortness of breath, palpitations, diaphoresis, nausea and vomiting. His pain is aggravated by exertion, emotional stress and is alleviated by rest. He took Xarelto at that time as he forgot to take the medication the night before. His troponin levels were elevated. He was transferred for cardiac catheterization.     Review of Systems  Review of Systems   Constitutional: Negative.  Negative for chills and fever.   HENT: Negative.  Negative for hearing loss.    Eyes: Negative.    Respiratory: Negative.  Negative for cough and shortness of breath.    Cardiovascular: Negative.  Negative for chest pain, palpitations and leg swelling.   Gastrointestinal: Negative.  Negative for abdominal pain, nausea and vomiting.   Genitourinary: Negative.  Negative for dysuria and urgency.   Musculoskeletal: Negative.  Negative for myalgias.   Skin: Negative.  Negative for rash.   Neurological: Negative.  Negative for dizziness, weakness and headaches.   Hematological:  Does not bruise/bleed easily.   Psychiatric/Behavioral: Negative.  The patient is not nervous/anxious.      Past Medical History   has a past medical history of Blood clotting disorder (HCC), Cancer (HCC),  Diabetes (HCC), High cholesterol, and Hypertension.    Surgical History   has no past surgical history on file.    Family History  family history is not on file.    Social History   reports that he quit smoking about 43 years ago. His smoking use included pipe. He has never used smokeless tobacco. He reports current alcohol use. He reports that he does not use drugs.    Medications  Prior to Admission Medications   Prescriptions Last Dose Informant Patient Reported? Taking?   lisinopril-hydrochlorothiazide (PRINZIDE) 20-12.5 MG per tablet 2/24/2023 at 1000  Yes Yes   Sig: Take 1 Tablet by mouth every day.   metFORMIN (GLUCOPHAGE) 500 MG Tab 2/24/2023 at 1000  Yes Yes   Sig: Take 500 mg by mouth 2 times a day with meals.   rivaroxaban (XARELTO) 20 MG Tab tablet 2/24/2023 at 1000  Yes Yes   Sig: Take 20 mg by mouth with dinner. Indications: Blockage of Blood Vessel to Lung by a Particle      Facility-Administered Medications: None       Allergies  No Known Allergies    Vital signs in last 24 hours  Temp:  [36.5 °C (97.7 °F)-36.6 °C (97.9 °F)] 36.6 °C (97.9 °F)  Pulse:  [56-66] 56  Resp:  [12-18] 17  BP: (112-136)/(68-79) 113/69  SpO2:  [90 %-93 %] 92 %    Physical Exam  Physical Exam  Constitutional:       Appearance: Normal appearance. He is well-developed and normal weight.   HENT:      Head: Normocephalic and atraumatic.      Mouth/Throat:      Mouth: Mucous membranes are moist.   Eyes:      Extraocular Movements: Extraocular movements intact.      Conjunctiva/sclera: Conjunctivae normal.   Cardiovascular:      Rate and Rhythm: Normal rate and regular rhythm.      Pulses: Normal pulses.      Heart sounds: Normal heart sounds.   Pulmonary:      Effort: Pulmonary effort is normal.      Breath sounds: Normal breath sounds.   Abdominal:      General: Bowel sounds are normal.      Palpations: Abdomen is soft.   Musculoskeletal:         General: Normal range of motion.      Cervical back: Normal range of motion and neck  supple.   Skin:     General: Skin is warm and dry.   Neurological:      General: No focal deficit present.      Mental Status: He is alert and oriented to person, place, and time. Mental status is at baseline.   Psychiatric:         Mood and Affect: Mood normal.         Behavior: Behavior normal.         Thought Content: Thought content normal.         Judgment: Judgment normal.       Lab Review  Lab Results   Component Value Date/Time    WBC 8.5 02/24/2023 05:07 PM    RBC 5.10 02/24/2023 05:07 PM    HEMOGLOBIN 16.5 02/24/2023 05:07 PM    HEMATOCRIT 47.2 02/24/2023 05:07 PM    MCV 92.5 02/24/2023 05:07 PM    MCH 32.4 02/24/2023 05:07 PM    MCHC 35.0 02/24/2023 05:07 PM    MPV 11.4 02/24/2023 05:07 PM      Lab Results   Component Value Date/Time    SODIUM 139 02/24/2023 05:07 PM    POTASSIUM 4.0 02/24/2023 05:07 PM    CHLORIDE 101 02/24/2023 05:07 PM    CO2 25 02/24/2023 05:07 PM    GLUCOSE 115 (H) 02/24/2023 05:07 PM    BUN 22 02/24/2023 05:07 PM    CREATININE 0.93 02/24/2023 05:07 PM      Lab Results   Component Value Date/Time    ASTSGOT 33 02/24/2023 05:07 PM    ALTSGPT 35 02/24/2023 05:07 PM     Lab Results   Component Value Date/Time    TROPONINT 442 (H) 02/25/2023 06:25 AM       No results for input(s): NTPROBNP in the last 72 hours.    Cardiac Imaging and Procedures Review  CARDIAC STUDIES/PROCEDURES:    CTA OF CHEST [facility] and [place] (11/3/16)  Low lung volumes with perihilar and lower lobe subsegmental atelectasis/infiltrates.   Filling defect right lower lobe pulmonary artery involving second and third order branches, compatible with embolism.   No evidence of large central embolism or heart strain.   (study result reviewed)    EKG performed on (02/25/23) was reviewed: EKG personally interpreted shows sinus rhythm with right bundle branch block.    Assessment/Plan  NSTEMI: He s a 67 y.o. male with a past medical history of diabetes mellitus, hypertension and  hyperlipidemia, pulmonary embolism on chronic anticoagulation therapy (Xarelto) who was transferred from [facility] on 2/24/2023 for NSTEMI. He has been started on aspirin, metoprolol and atorvastatin. His Xarelto has been held since yesterday. We will schedule for cardiac catheterization for later this afternoon.  He understands the risks and benefits and agrees with plan.   The risks, benefits, and alternatives to coronary angiography with IV sedation were discussed in great detail. Specific risks mentioned include bleeding, infection, kidney damage, allergic reaction, cardiac perforation with possible tamponade requiring sommer-cardiocentesis or possible open heart surgery. In addition, we discussed that 10% of patients will experience small to moderate bruising at the side of the arterial puncture. Lastly the risks of heart attack, stroke, and death were discussed; the risks of major complications such as heart attack or stroke caused by the angiogram is less than 1%; the risk of death is approximately 1 in 1000. The patient verbalized understanding of these potential complications and wishes to proceed with this procedure.     Thank you for allowing me to participate in the care of this patient.    I will continue to follow this patient    Please contact me with any questions.    Eduar Hooker M.D.   Cardiologist, [facility]  [phone]

## 2023-02-25 NOTE — PROGRESS NOTES
BRITTANIE from Lab called with critical result of heparin xa at 1.05 Critical lab result read back to BRITTANIE.   Dr. Montes notified of critical lab result at 0904.  Critical lab result read back by Dr. Montes.

## 2023-02-25 NOTE — CARE PLAN
Problem: Knowledge Deficit - Standard  Goal: Patient and family/care givers will demonstrate understanding of plan of care, disease process/condition, diagnostic tests and medications  Outcome: Progressing     Problem: Respiratory  Goal: Patient will achieve/maintain optimum respiratory ventilation and gas exchange  Outcome: Progressing     Problem: Hemodynamics  Goal: Patient's hemodynamics, fluid balance and neurologic status will be stable or improve  Outcome: Progressing   The patient is Stable - Low risk of patient condition declining or worsening    Shift Goals  Clinical Goals: monitor vitals, pain management  Patient Goals: comfort    Progress made toward(s) clinical / shift goals:   Patient remains on room air satting well, VSS, patient has no complaints of pain.

## 2023-02-26 PROBLEM — R07.81 PLEURODYNIA: Status: ACTIVE | Noted: 2023-02-26

## 2023-02-26 LAB
ANION GAP SERPL CALC-SCNC: 10 MMOL/L (ref 7–16)
BUN SERPL-MCNC: 23 MG/DL (ref 8–22)
CALCIUM SERPL-MCNC: 8.4 MG/DL (ref 8.5–10.5)
CHLORIDE SERPL-SCNC: 105 MMOL/L (ref 96–112)
CHOLEST SERPL-MCNC: 178 MG/DL (ref 100–199)
CO2 SERPL-SCNC: 21 MMOL/L (ref 20–33)
CREAT SERPL-MCNC: 0.77 MG/DL (ref 0.5–1.4)
EKG IMPRESSION: NORMAL
EKG IMPRESSION: NORMAL
ERYTHROCYTE [DISTWIDTH] IN BLOOD BY AUTOMATED COUNT: 43.3 FL (ref 35.9–50)
EST. AVERAGE GLUCOSE BLD GHB EST-MCNC: 131 MG/DL
GFR SERPLBLD CREATININE-BSD FMLA CKD-EPI: 97 ML/MIN/1.73 M 2
GLUCOSE SERPL-MCNC: 110 MG/DL (ref 65–99)
HBA1C MFR BLD: 6.2 % (ref 4–5.6)
HCT VFR BLD AUTO: 39.5 % (ref 42–52)
HDLC SERPL-MCNC: 31 MG/DL
HGB BLD-MCNC: 13.8 G/DL (ref 14–18)
LDLC SERPL CALC-MCNC: 117 MG/DL
MCH RBC QN AUTO: 31.8 PG (ref 27–33)
MCHC RBC AUTO-ENTMCNC: 34.9 G/DL (ref 33.7–35.3)
MCV RBC AUTO: 91 FL (ref 81.4–97.8)
PLATELET # BLD AUTO: 196 K/UL (ref 164–446)
PMV BLD AUTO: 11.1 FL (ref 9–12.9)
POTASSIUM SERPL-SCNC: 3.8 MMOL/L (ref 3.6–5.5)
RBC # BLD AUTO: 4.34 M/UL (ref 4.7–6.1)
SODIUM SERPL-SCNC: 136 MMOL/L (ref 135–145)
TRIGL SERPL-MCNC: 149 MG/DL (ref 0–149)
WBC # BLD AUTO: 9.9 K/UL (ref 4.8–10.8)

## 2023-02-26 PROCEDURE — A9270 NON-COVERED ITEM OR SERVICE: HCPCS | Performed by: INTERNAL MEDICINE

## 2023-02-26 PROCEDURE — 700102 HCHG RX REV CODE 250 W/ 637 OVERRIDE(OP): Performed by: INTERNAL MEDICINE

## 2023-02-26 PROCEDURE — 80061 LIPID PANEL: CPT

## 2023-02-26 PROCEDURE — 80048 BASIC METABOLIC PNL TOTAL CA: CPT

## 2023-02-26 PROCEDURE — 770020 HCHG ROOM/CARE - TELE (206)

## 2023-02-26 PROCEDURE — 93010 ELECTROCARDIOGRAM REPORT: CPT | Mod: 76,GZ | Performed by: INTERNAL MEDICINE

## 2023-02-26 PROCEDURE — 93010 ELECTROCARDIOGRAM REPORT: CPT | Mod: GZ | Performed by: INTERNAL MEDICINE

## 2023-02-26 PROCEDURE — A9270 NON-COVERED ITEM OR SERVICE: HCPCS | Performed by: STUDENT IN AN ORGANIZED HEALTH CARE EDUCATION/TRAINING PROGRAM

## 2023-02-26 PROCEDURE — 85027 COMPLETE CBC AUTOMATED: CPT

## 2023-02-26 PROCEDURE — 99232 SBSQ HOSP IP/OBS MODERATE 35: CPT | Performed by: HOSPITALIST

## 2023-02-26 PROCEDURE — 99233 SBSQ HOSP IP/OBS HIGH 50: CPT | Performed by: INTERNAL MEDICINE

## 2023-02-26 PROCEDURE — 700102 HCHG RX REV CODE 250 W/ 637 OVERRIDE(OP): Performed by: STUDENT IN AN ORGANIZED HEALTH CARE EDUCATION/TRAINING PROGRAM

## 2023-02-26 PROCEDURE — 83036 HEMOGLOBIN GLYCOSYLATED A1C: CPT

## 2023-02-26 RX ORDER — COLCHICINE 0.6 MG/1
0.6 TABLET ORAL 2 TIMES DAILY
Status: DISCONTINUED | OUTPATIENT
Start: 2023-02-26 | End: 2023-02-27 | Stop reason: HOSPADM

## 2023-02-26 RX ORDER — CLOPIDOGREL BISULFATE 75 MG/1
75 TABLET ORAL DAILY
Status: DISCONTINUED | OUTPATIENT
Start: 2023-02-28 | End: 2023-02-27 | Stop reason: HOSPADM

## 2023-02-26 RX ORDER — CLOPIDOGREL 300 MG/1
600 TABLET, FILM COATED ORAL ONCE
Status: COMPLETED | OUTPATIENT
Start: 2023-02-27 | End: 2023-02-27

## 2023-02-26 RX ADMIN — ACETAMINOPHEN 650 MG: 325 TABLET, FILM COATED ORAL at 06:02

## 2023-02-26 RX ADMIN — RIVAROXABAN 20 MG: 20 TABLET, FILM COATED ORAL at 17:20

## 2023-02-26 RX ADMIN — ACETAMINOPHEN 650 MG: 325 TABLET, FILM COATED ORAL at 00:10

## 2023-02-26 RX ADMIN — PRASUGREL 10 MG: 10 TABLET, FILM COATED ORAL at 05:42

## 2023-02-26 RX ADMIN — ATORVASTATIN CALCIUM 80 MG: 80 TABLET, FILM COATED ORAL at 17:19

## 2023-02-26 RX ADMIN — ACETAMINOPHEN 650 MG: 325 TABLET, FILM COATED ORAL at 17:18

## 2023-02-26 RX ADMIN — COLCHICINE 0.6 MG: 0.6 TABLET ORAL at 17:20

## 2023-02-26 RX ADMIN — COLCHICINE 0.6 MG: 0.6 TABLET ORAL at 09:55

## 2023-02-26 RX ADMIN — ACETAMINOPHEN 650 MG: 325 TABLET, FILM COATED ORAL at 11:52

## 2023-02-26 RX ADMIN — ASPIRIN 81 MG 81 MG: 81 TABLET ORAL at 05:42

## 2023-02-26 RX ADMIN — METOPROLOL TARTRATE 25 MG: 25 TABLET, FILM COATED ORAL at 05:42

## 2023-02-26 RX ADMIN — METOPROLOL TARTRATE 25 MG: 25 TABLET, FILM COATED ORAL at 17:21

## 2023-02-26 ASSESSMENT — COGNITIVE AND FUNCTIONAL STATUS - GENERAL
MOBILITY SCORE: 24
SUGGESTED CMS G CODE MODIFIER MOBILITY: CH
SUGGESTED CMS G CODE MODIFIER DAILY ACTIVITY: CH
DAILY ACTIVITIY SCORE: 24

## 2023-02-26 ASSESSMENT — ENCOUNTER SYMPTOMS
DIZZINESS: 0
DOUBLE VISION: 0
SORE THROAT: 0
PALPITATIONS: 0
LOSS OF CONSCIOUSNESS: 0
NAUSEA: 0
COUGH: 0
BLURRED VISION: 0
SHORTNESS OF BREATH: 0
BACK PAIN: 0
DIARRHEA: 0
VOMITING: 0
HEADACHES: 0
CHILLS: 0
FEVER: 0
ABDOMINAL PAIN: 0

## 2023-02-26 ASSESSMENT — PAIN DESCRIPTION - PAIN TYPE
TYPE: ACUTE PAIN

## 2023-02-26 ASSESSMENT — FIBROSIS 4 INDEX: FIB4 SCORE: 1.96

## 2023-02-26 NOTE — PROGRESS NOTES
4 Eyes Skin Assessment Completed by LINDSEY Farr and LINDSEY Ramírez.    Head WDL  Ears WDL  Nose WDL  Mouth WDL  Neck WDL  Breast/Chest WDL  Shoulder Blades WDL  Spine WDL  (R) Arm/Elbow/Hand WDL  (L) Arm/Elbow/Hand WDL  Abdomen WDL  Groin WDL  Scrotum/Coccyx/Buttocks WDL  (R) Leg WDL  (L) Leg WDL  (R) Heel/Foot/Toe WDL  (L) Heel/Foot/Toe WDL          Devices In Places ECG and Pulse Ox      Interventions In Place Gray Ear Foams    Possible Skin Injury No    Pictures Uploaded Into Epic N/A  Wound Consult Placed N/A  RN Wound Prevention Protocol Ordered No

## 2023-02-26 NOTE — PROGRESS NOTES
Hospital Medicine Daily Progress Note    Date of Service  2/26/2023    Chief Complaint  Juanito Rodriguez is a 67 y.o. male admitted 2/24/2023 with chest pain    Hospital Course  66yo PMHx DM,HLD, HTN, distant Hx PE for which he is on Rivaroxaban.  Presnted with CP.  In ED Trop 150, no ST elevation on EKG.  Admitted with Dx of NSTEMI.  Taken to [facility] on 2/25 where an LAD lesion was treated with LATONYA.  Procedure complicated by perforation addressed with second stent    Interval Problem Update  On my examination the patient reports that his chest pain is different than it was when he came in.  Before is more constant and more dull and pressure-like, since the cardiac cath, he does not have any pain when he lays still and breathes shallowly however if he takes a deep breath he feels a sharp pain in his chest.  This is worse when he exerts himself as well if he twists or turns or when he walks.  Despite this he has been able to walk the length of the unit without any shortness of breath or dizziness.  Overall he does feel better than when he came to the hospital.    I have discussed this patient's plan of care and discharge plan at IDT rounds today with Case Management, Nursing, Nursing leadership, and other members of the IDT team.    Consultants/Specialty  cardiology    Code Status  Full Code    Disposition  Patient is not medically cleared for discharge.   Anticipate discharge to to home with close outpatient follow-up.  I have placed the appropriate orders for post-discharge needs.    Review of Systems  Review of Systems   Constitutional:  Negative for chills and fever.   HENT:  Negative for nosebleeds and sore throat.    Eyes:  Negative for blurred vision and double vision.   Respiratory:  Negative for cough and shortness of breath.    Cardiovascular:  Positive for chest pain. Negative for palpitations and leg swelling.   Gastrointestinal:  Negative for abdominal pain, diarrhea, nausea and vomiting.    Genitourinary:  Negative for dysuria and urgency.   Musculoskeletal:  Negative for back pain.   Skin:  Negative for rash.   Neurological:  Negative for dizziness, loss of consciousness and headaches.      Physical Exam  Temp:  [36.1 °C (97 °F)-36.7 °C (98.1 °F)] 36.1 °C (97 °F)  Pulse:  [56-81] 67  Resp:  [16-38] 24  BP: (100-173)/(60-95) 122/73  SpO2:  [90 %-98 %] 92 %    Physical Exam  Constitutional:       General: He is not in acute distress.     Appearance: He is well-developed. He is not diaphoretic.   HENT:      Head: Normocephalic and atraumatic.   Eyes:      Conjunctiva/sclera: Conjunctivae normal.   Neck:      Vascular: No JVD.   Cardiovascular:      Rate and Rhythm: Normal rate.      Heart sounds: No murmur heard.    Friction rub present. No gallop.   Pulmonary:      Effort: Pulmonary effort is normal. No respiratory distress.      Breath sounds: No stridor. No wheezing or rales.   Abdominal:      Palpations: Abdomen is soft.      Tenderness: There is no abdominal tenderness. There is no guarding or rebound.   Musculoskeletal:         General: No tenderness.   Skin:     General: Skin is warm and dry.      Capillary Refill: Capillary refill takes less than 2 seconds.      Findings: No rash.   Neurological:      General: No focal deficit present.      Mental Status: He is alert and oriented to person, place, and time.   Psychiatric:         Thought Content: Thought content normal.       Fluids    Intake/Output Summary (Last 24 hours) at 2/26/2023 0738  Last data filed at 2/26/2023 0203  Gross per 24 hour   Intake 1895.95 ml   Output 850 ml   Net 1045.95 ml       Laboratory  Recent Labs     02/24/23  1707 02/26/23  0422   WBC 8.5 9.9   RBC 5.10 4.34*   HEMOGLOBIN 16.5 13.8*   HEMATOCRIT 47.2 39.5*   MCV 92.5 91.0   MCH 32.4 31.8   MCHC 35.0 34.9   RDW 42.6 43.3   PLATELETCT 213 196   MPV 11.4 11.1     Recent Labs     02/24/23  1707 02/26/23  0422   SODIUM 139 136   POTASSIUM 4.0 3.8   CHLORIDE 101 105    CO2 25 21   GLUCOSE 115* 110*   BUN 22 23*   CREATININE 0.93 0.77   CALCIUM 9.4 8.4*     Recent Labs     02/24/23  1707 02/25/23  0811   APTT 35.7 32.4   INR 2.01* 1.32*               Imaging  EC-ECHOCARDIOGRAM LTD W/O CONT   Final Result      EC-ECHOCARDIOGRAM LTD W/O CONT   Final Result      EC-ECHOCARDIOGRAM COMPLETE W/O CONT   Final Result      CL-LEFT HEART CATHETERIZATION WITH POSSIBLE INTERVENTION    (Results Pending)        Assessment/Plan  * Non-STEMI (non-ST elevated myocardial infarction) (HCC)- (present on admission)  Assessment & Plan  Now s/p left heart cath 2/25 with drug-eluting stent placed to the LAD.  Procedure complicated by coronary artery perforation which was treated with a second stent.  Dual antiplatelet therapy  High-dose statin  Beta-blocker  Lifestyle counseling  Cardiology following  Post cath transthoracic echo shows preserved ejection fraction with no significant structural disease  Cardiac rehab    Pleurodynia  Assessment & Plan  Patient's post procedure chest pain is most consistent with pericarditis  Initiate colchicine  Monitor for further signs of ischemia    History of rectal carcinoma ([facility])  Assessment & Plan  Follow-up as outpatient    EMIGDIO (obstructive sleep apnea)  Assessment & Plan  Continue home CPAP    Primary hypertension  Assessment & Plan  Was previously maintained on lisinopril hydrochlorothiazide  In-house has been transitioned to beta-blocker in setting of acute NSTEMI  We will add an ACE inhibitor as pressures allow    Type 2 diabetes mellitus (HCC)  Assessment & Plan  As an outpatient is maintained on metformin  A1c on this admission 6.2 which would imply good control  Good candidate for an SGLT2 should he need a second agent for his diabetes or should he demonstrate any decrease in his pump function in the future  Monitor in house with sliding scale insulin  Resume metformin once is clear he is not going to be getting any more dye exposure    History of pulmonary  embolism  Assessment & Plan  Rivaroxaban was held briefly as result of the perforation which occurred in Cath Lab, but he has been restarted on it now.  Repeat echo does not demonstrate any signs of further bleeding.    Morbid obesity (HCC)  Assessment & Plan  Will need counseling when clinically appropriate         VTE prophylaxis: therapeutic anticoagulation with rivaroxaban    I have performed a physical exam and reviewed and updated ROS and Plan today (2/26/2023). In review of yesterday's note (2/25/2023), there are no changes except as documented above.

## 2023-02-26 NOTE — PROGRESS NOTES
Pt transported to room 821 from [facility] for next level of care. Pt ambulatory to room, placed on tele box, monitor room notified, VSS, right radial cath site CDI. Pt verbalizes understanding of plan of care and all questions are answered at this time. All personal belongings within reach.

## 2023-02-26 NOTE — CONSULTS
CRITICAL CARE MEDICINE ATTENDING CONSULTATION    Date of admission  2/24/2023    Chief Complaint  67 y.o. male admitted 2/24/2023 with an acute non-STEMI.    History of Present Illness      I was kindly asked by Dr. Javier Salinas to see and evaluate this gentleman following PCI.  This is a very pleasant 67-year-old gentleman with a history of recurrent pulmonary embolism on chronic anticoagulation with rivaroxaban, primary hypertension, diabetes mellitus type 2 with a glycohemoglobin of 6.2 in April 2022, dyslipidemia, rectal adenocarcinoma with resection in 2017 and EMIGDIO with an AHI of 17.7.  He appears to be surgically cured of his rectal adenocarcinoma and did not require adjuvant chemotherapy or radiation.  He is not on any treatment for his EMIGDIO.  He was admitted to [facility] on or about 2/25/2022 with chest pain.  He was diagnosed with an acute non-STEMI.  Today Dr. Javier Salinas performed cardiac catheterization and he was found to have severe 90% mid LAD stenosis, 80% mid stenosis of a diagonal, 70% ostial/proximal stenosis of OM1 as well as additional nonflow limiting disease.  He underwent PCI of the LAD.  This was complicated by perforation.  The perforation was successfully treated by covering with an additional stent.  A stat echocardiogram following the procedure revealed a trivial pericardial effusion.  This gentleman is admitted to the cardiac intensive care unit for very close observation.      Review of Systems  Review of Systems   Constitutional:  Negative for chills, diaphoresis and fever.   HENT:  Negative for nosebleeds, sinus pain and sore throat.    Eyes:  Negative for pain, discharge and redness.   Respiratory:  Negative for cough, hemoptysis, shortness of breath and stridor.    Cardiovascular:  Positive for chest pain. Negative for claudication and leg swelling.   Gastrointestinal:  Negative for abdominal pain, nausea and vomiting.   Genitourinary:  Negative for dysuria, hematuria and urgency.    Musculoskeletal:  Negative for myalgias and neck pain.   Skin:  Negative for rash.   Neurological:  Negative for seizures, loss of consciousness and headaches.   Endo/Heme/Allergies:  Does not bruise/bleed easily.   Psychiatric/Behavioral:  Negative for hallucinations. The patient is not nervous/anxious.      Vital Signs for the last 24 hours  Temp:  [36.5 °C (97.7 °F)-36.7 °C (98.1 °F)] 36.7 °C (98 °F)  Pulse:  [56-81] 70  Resp:  [12-38] 31  BP: (112-173)/(68-95) 137/82  SpO2:  [90 %-98 %] 95 %    Hemodynamic parameters for the last 24 hours       Vent Settings for the last 24 hours       Physical Exam  Physical Exam  Constitutional:       Appearance: He is not diaphoretic.   HENT:      Head: Normocephalic.      Mouth/Throat:      Pharynx: Oropharynx is clear.   Eyes:      Pupils: Pupils are equal, round, and reactive to light.   Cardiovascular:      Comments: Sinus rhythm  Pulmonary:      Breath sounds: No wheezing or rales.   Abdominal:      General: There is no distension.      Tenderness: There is no abdominal tenderness.   Musculoskeletal:      Cervical back: Normal range of motion.      Right lower leg: No edema.      Left lower leg: No edema.   Skin:     General: Skin is warm.      Capillary Refill: Capillary refill takes less than 2 seconds.   Neurological:      General: No focal deficit present.      Mental Status: He is oriented to person, place, and time.      Cranial Nerves: No cranial nerve deficit.       Medications  Current Facility-Administered Medications   Medication Dose Route Frequency Provider Last Rate Last Admin    atorvastatin (LIPITOR) tablet 80 mg  80 mg Oral Q EVENING Augustin Doran M.D.   80 mg at 02/25/23 1704    metoprolol tartrate (LOPRESSOR) tablet 25 mg  25 mg Oral TWICE DAILY Augustin Doran M.D.   25 mg at 02/25/23 1800    aspirin (ASA) chewable tab 81 mg  81 mg Oral DAILY Augustin Doran M.D.   81 mg at 02/25/23 0513    NOREPINEPHRINE BITARTRATE 1 MG/ML IV SOLN              [START ON 2/26/2023] prasugrel (EFFIENT) tablet 10 mg  10 mg Oral DAILY Javier Sailnas M.D.        NS infusion   Intravenous Continuous Javier Salinas M.D. 100 mL/hr at 02/25/23 1708 New Bag at 02/25/23 1708       Fluids    Intake/Output Summary (Last 24 hours) at 2/25/2023 2023  Last data filed at 2/25/2023 1936  Gross per 24 hour   Intake 780 ml   Output 300 ml   Net 480 ml       Laboratory      Recent Labs     02/24/23  1707   SODIUM 139   POTASSIUM 4.0   CHLORIDE 101   CO2 25   BUN 22   CREATININE 0.93   CALCIUM 9.4     Recent Labs     02/24/23  1707   ALTSGPT 35   ASTSGOT 33   ALKPHOSPHAT 51   TBILIRUBIN 0.7   GLUCOSE 115*     Recent Labs     02/24/23  1707   WBC 8.5   NEUTSPOLYS 72.00   LYMPHOCYTES 21.40*   MONOCYTES 5.40   EOSINOPHILS 0.40   BASOPHILS 0.60   ASTSGOT 33   ALTSGPT 35   ALKPHOSPHAT 51   TBILIRUBIN 0.7     Recent Labs     02/24/23  1707 02/25/23  0811   RBC 5.10  --    HEMOGLOBIN 16.5  --    HEMATOCRIT 47.2  --    PLATELETCT 213  --    PROTHROMBTM 22.3* 16.2*   APTT 35.7 32.4   INR 2.01* 1.32*       Imaging  None    Assessment/Plan      Acute non-STEMI   S/P PCI to the LAD complicated by perforation - perforation successfully treated with stent - trivial pericardial effusion immediately following procedure   Observed closely in the ICU with short follow-up echocardiogram   Continue aspirin and prasugrel   Continue high intensity statin   Continue metoprolol tartrate, 25 mg twice daily    History of recurrent pulmonary embolism on chronic anticoagulation with rivaroxaban   Hold rivaroxaban for now and resume when clinically appropriate    Primary hypertension   Goal SBP less than 160   Continue metoprolol tartrate, 25 mg twice daily    Diabetes mellitus type 2   Glycohemoglobin 6.2 on 4/21/2022    Dyslipidemia   High intensity statin    History of rectal adenocarcinoma with prior resection in 2017   Surgically cured - followed by surveillance endoscopy   He did not receive adjuvant chemotherapy  or radiation    Mild EMIGDIO with AHI of 17.7   Not on any therapy at this time   He requires outpatient follow-up and I recommend PAP therapy      VTE:  Contraindicated  Ulcer: Not Indicated  Lines: None    I have performed a physical exam and reviewed and updated ROS and Plan today (2/25/2023). In review of yesterday's note (2/24/2023), there are no changes except as documented above.     I have assessed and reassessed his respiratory status, blood pressure, hemodynamics and cardiovascular status.  This gentleman is critically ill following PCI for an acute non-STEMI.  He is at increased risk for worsening cardiovascular system dysfunction.    Discussed patient condition and risk of morbidity and/or mortality with RN    The patient remains critically ill.  Critical care time = 35 minutes in directly providing and coordinating critical care and extensive data review.  No time overlap and excludes procedures.    A Critical Care Medicine progress note may have been authored by a resident physician or advanced practitioner of nursing under my direct supervision on this date of service.  As the supervising and attending physician, I have either attested to or cosigned that document.  IN THE EVENT THAT DISCREPANCIES EXIST BETWEEN THIS DOCUMENT AND ANY DOCUMENT THAT I HAVE ATTESTED TO OR COSIGNED ON THIS DATE OF SERVICE, THEN THIS DOCUMENT REMAINS THE FINAL AUTHORITY AS TO MY ASSESSMENT AND PLAN REGARDING THE CARE OF THIS PATIENT.    Dean Cobb MD  Pulmonary and Critical Care Medicine

## 2023-02-26 NOTE — ASSESSMENT & PLAN NOTE
S/p PCI to the LAD after presenting with chest pain.  Procedure complicated by perforation, successfully treated with stenting.  ICU observation today.  Echocardiogram showing trivial pericardial effusion.  DAPT.  High intensity statin.  Metoprolol tartrate 25 mg BID.  Titrate as needed.

## 2023-02-26 NOTE — PROGRESS NOTES
"Notified Dr. Matt that patient has continued to complain of mild 4/10 chest pressure, patient states \"I've had trouble sleeping because I am breathing shallow and can't take a deep breath\". Patient's description of pain is unchanged since beginning of shift. Received orders from Dr. Matt for PRN tylenol and stat EKG.  "

## 2023-02-26 NOTE — DIETARY
Nutrition Services: Cardiac Diet Education Consult   Day 2 of admit.  Juanito Rodriguez is a 67 y.o. male with admitting DX of ACS (acute coronary syndrome), NSTEMI (non-ST elevated myocardial infarction)     RD able to visit pt at bedside to provide heart healthy diet education. RD discussed limiting saturated fats and sodium, and provided handout reinforcing topics discussed. Pt relayed knowledge regarding reducing intake of simple carbohydrates given diabetes diagnosis, and expressed an interest in nutrition. Pt demonstrated readiness and evidence of learning. RD able to answer all questions to patient's satisfaction.     No other education needs identified at this time. Consider referral to outpatient nutrition services for continuation of education as indicated or per pt preferences.     Please re-consult RD as indicated.

## 2023-02-26 NOTE — ASSESSMENT & PLAN NOTE
Glycohemoglobin 6.2 last year.  Will check updated A1c.  Holding home metformin at this time.  Restart as clinically appropriate.

## 2023-02-26 NOTE — PROGRESS NOTES
Received bedside report from RN, pt care assumed, VSS. Patient Aox4, reports 4/10 chest pain, day shift RN states patient has had chest pain since returning from cath lab. Dr. Mejia at bedside and notified of pain.   Right radial TR band removed, site CDI/soft. Gauze/tegaderm dressing in place. Patient educated on limited ROM in right wrist, verbalizes understanding.

## 2023-02-26 NOTE — CARE PLAN
The patient is Stable - Low risk of patient condition declining or worsening    Shift Goals  Clinical Goals: hemodynamic stability, monitor pain, comfort/rest  Patient Goals: rest, go home tomorrow/soon  Family Goals: none present    Progress made toward(s) clinical / shift goals:  Vitals stable. Patient has continued to report mild chest pain/pressure since cath lab, states it has impacted his ability to sleep. PRN tylenol administered at midnight, patient appears to be sleeping comfortably at this time with no signs of acute distress.       Problem: Knowledge Deficit - Standard  Goal: Patient and family/care givers will demonstrate understanding of plan of care, disease process/condition, diagnostic tests and medications  Outcome: Progressing     Problem: Respiratory  Goal: Patient will achieve/maintain optimum respiratory ventilation and gas exchange  Outcome: Progressing     Problem: Hemodynamics  Goal: Patient's hemodynamics, fluid balance and neurologic status will be stable or improve  Outcome: Progressing     Problem: Pain - Standard  Goal: Alleviation of pain or a reduction in pain to the patient’s comfort goal  Outcome: Progressing

## 2023-02-26 NOTE — ASSESSMENT & PLAN NOTE
Noted on history.  Treated surgically follows up with surveillance endoscopies.  Did not receive adjuvant chemotherapy or radiation.

## 2023-02-26 NOTE — PROCEDURES
CARDIOLOGY INPATIENT FOLLOW UP NOTE:    Impressions:  #.  NSTEMI status post IVUS guided PCI to severe mid LAD disease with 4x38mm LATONYA, complicated by perforation that was successfully treated with a 4x15mm covered stent  #.  Coronary artery disease  #. Pleuritic Chest pain from trivial pericardial effusion - pericarditis  #.  Dilated ascending aorta 4.3 cm 2/25/23 echo  #.  On chronic AP and AC agents  #.  History of pulmonary embolism on chronic anticoagulation (11/2016)  #.  Dyslipidemia  #.  Hypertension on laith-HCTZ as an OP  #.  Prediabetes on metformin: A1C 6.2% 2/26/23    TTE 2/25/23:  No prior study is available for comparison.   Normal left ventricular systolic function.  The left ventricular ejection fraction is visually estimated to be 60%.  Mild mitral regurgitation.  Mild aortic insufficiency.  The ascending aorta is dilated with a diameter of 4.3 cm.    PCI 2/25/23:  1.  Severe mid LAD disease status post successful IVUS guided PCI Synergy 4 x 38 mm LATONYA (postdilated proximally to 5.5 mm), complicated by a perforation that was successfully treated with a 4x15mm covered stent.  2.  Residual severe CAD in a medium in caliber OM and a medium in caliber diagonal branch, and residual nonobstructive CAD throughout the RCA and the remaining of the circumflex artery.    Recommendations:  -Resume rivaroxaban 20 mg tonight, stop prasugrel, switch to Plavix 600 mg tomorrow morning followed by 75 mg daily, stop aspirin  -Consider lifelong rivaroxaban 20 mg plus Plavix 75 mg daily given the need for covered stent in his mid LAD  -Metoprolol tartrate 25 mg twice a day  -High intensity statin, atorvastatin 80 mg  -Lipid panel ordered to his labs: Target LDL less than 70 and triglyceride less than 150  -Blood pressure has been normal and well controlled off lisinopril-HCTZ for the last 24 hours, continue to hold for now  - annual TTE for his Asc aortic dilatation, target HR  <60 and BP <120/80 mmHg  - colchicine 0.6mg  "PO BID for 2 weeks  - advised patient to work on getting a Cardiology appt at [facility] in 4 weeks and encouraged him to participate in cardiac rehab there    Patient lives in [place] in [place], here visiting his daughter.    Will sign off. Please call with questions. Thank you.     SUBJECTIVE/INTERIM EVENTS:  Mild pleuritic CP, improves with Tylenol. Not needing oxygen. Denies orthopnea, PND. Has not ambulated outside of his room yet.    Tele: SR 60's bpm    EXAM:  /63   Pulse (!) 59   Temp 36.3 °C (97.3 °F) (Temporal)   Resp (!) 24   Ht 1.778 m (5' 10\")   Wt 97.1 kg (214 lb 1.1 oz)   SpO2 91%   BMI 30.72 kg/m²   Gen: well appearing  HEENT: Symmetric face. Anicteric sclerae. Moist mucus membranes  NECK: No JVD. No lymphadenopathy  CARDIAC: Normal PMI, regular, normal S1, S2. without murmur , no rubs   VASCULATURE: Normal carotid amplitude without bruit.   RESP: Clear to auscultation bilaterally   ABD: Soft, non-tender, non-distended  EXT: No edema, no clubbing or cyanosis  SKIN: Warm and dry  NEURO: No gross deficits   PSYCH: Appropriate affect, participates in conversation    Studies  Lab Results   Component Value Date/Time    SODIUM 136 02/26/2023 04:22 AM    POTASSIUM 3.8 02/26/2023 04:22 AM    CHLORIDE 105 02/26/2023 04:22 AM    CO2 21 02/26/2023 04:22 AM    GLUCOSE 110 (H) 02/26/2023 04:22 AM    BUN 23 (H) 02/26/2023 04:22 AM    CREATININE 0.77 02/26/2023 04:22 AM       Medical records reviewed for this encounter    For this encounter I directly reviewed ECG tracings, Echo images, and Telemetry tracings      Current Facility-Administered Medications:     [START ON 2/27/2023] clopidogrel (PLAVIX) tablet 600 mg, 600 mg, Oral, Once, Javier Salinas M.D.    [START ON 2/28/2023] clopidogrel (PLAVIX) tablet 75 mg, 75 mg, Oral, DAILY, Javier Salinas M.D.    rivaroxaban (XARELTO) tablet 20 mg, 20 mg, Oral, PM MEAL, Javier Salinas M.D.    atorvastatin (LIPITOR) tablet 80 mg, 80 mg, Oral, Q EVENING, Augustin Anglin " JUSTINE Doran, 80 mg at 02/25/23 1704    metoprolol tartrate (LOPRESSOR) tablet 25 mg, 25 mg, Oral, TWICE DAILY, Augustin Doran M.D., 25 mg at 02/26/23 0542    aspirin (ASA) chewable tab 81 mg, 81 mg, Oral, DAILY, Augustin Doran M.D., 81 mg at 02/26/23 0542    acetaminophen (Tylenol) tablet 650 mg, 650 mg, Oral, Q6HRS PRN, Omar Matt D.O., 650 mg at 02/26/23 0602    Case discussed with primary team.    Javier Salinas MD, MPH [facility]  Interventional Cardiologist  [facility]   of Clinical Internal Medicine - [facility] Wilfredo VILLAGOMEZ

## 2023-02-26 NOTE — CARE PLAN
The patient is Stable - Low risk of patient condition declining or worsening    Shift Goals  Clinical Goals: monitor labs, vitals, prepare for heart cath  Patient Goals: prepare for heart cath    Progress made toward(s) clinical / shift goals:    Problem: Knowledge Deficit - Standard  Goal: Patient and family/care givers will demonstrate understanding of plan of care, disease process/condition, diagnostic tests and medications  Outcome: Progressing     Problem: Optimal Care for the Acute Coronary Syndrome Patient  Goal: Optimal Care for the Acute Coronary Syndrome Patient  Outcome: Progressing  Goal: Potential Interventions for the Acute Coronary Syndrome Patient  Outcome: Progressing     Problem: Optimal Care for the STEMI Patient  Goal: Optimal Care for the STEMI Patient  Outcome: Progressing     Problem: Optimal Care for the Cath Lab Patient  Goal: Optimal Care for the Cath Lab Patient  Outcome: Progressing     Problem: Discharge Care for the Acute Coronary Syndrome Patient  Goal: Patient will be discharged on guideline directed medical therapy  Outcome: Progressing     Problem: Respiratory  Goal: Patient will achieve/maintain optimum respiratory ventilation and gas exchange  Outcome: Progressing     Problem: Hemodynamics  Goal: Patient's hemodynamics, fluid balance and neurologic status will be stable or improve  Outcome: Progressing     Problem: Pain - Standard  Goal: Alleviation of pain or a reduction in pain to the patient’s comfort goal  Outcome: Progressing       Patient is not progressing towards the following goals:

## 2023-02-26 NOTE — PROGRESS NOTES
Juanito was admitted with NSTEMI s/p PCI c/b LAD perf treated with covering stent. Watched in ICU overnight d/t the perf.  Doing very well and normal VS. Stable for tele.  Discussed with Dr. Salinas. Care transferred to Dr. Tai. Anti-PLT and anticoagulants per cardiology.

## 2023-02-26 NOTE — CARE PLAN
Problem: Knowledge Deficit - Standard  Goal: Patient and family/care givers will demonstrate understanding of plan of care, disease process/condition, diagnostic tests and medications  Outcome: Progressing  Note: Discuss and review POC with patient/family. Re-educate as needed.       Problem: Optimal Care for the Acute Coronary Syndrome Patient  Goal: Optimal Care for the Acute Coronary Syndrome Patient  Outcome: Progressing     Problem: Respiratory  Goal: Patient will achieve/maintain optimum respiratory ventilation and gas exchange  Outcome: Progressing   The patient is Watcher - Medium risk of patient condition declining or worsening    Shift Goals  Clinical Goals: hemodynamic stability, monitor pain, comfort/rest  Patient Goals: rest, go home tomorrow/soon  Family Goals: none present    Progress made toward(s) clinical / shift goals:  Pt tx to tele for next level of care.    Patient is not progressing towards the following goals:

## 2023-02-26 NOTE — ASSESSMENT & PLAN NOTE
On rivaroxaban outpatient.  Holding at this time s/p PCI with complication.  Will reach out to cardiology regarding restart.

## 2023-02-26 NOTE — PROCEDURES
Cardiac Catheterization Procedure    DATE: 2/25/2023    : Javier Salinas MD, MPH    PROCEDURES PERFORMED:  Left heart catheterization  Coronary angiography  Percutaneous coronary intervention of LAD  Intravascular ultrasound  Moderate conscious sedation    INDICATIONS:  NSTEMI    CONSENT:  The complete alternatives, risks, and benefits of the procedure were explained to the patient. Signed informed consent was obtained and placed in the chart prior to the procedure.    MEDICATIONS:  Lidocaine  Fentanyl  Midazolam  Nitroglycerin  Verapamil  Heparin  Prasugrel 60 mg p.o.    MODERATE CONSCIOUS SEDATION:  I personally supervised the administration of moderate conscious sedation by the nursing staff for 49 minutes.  Start time: 1433  End time: 1544    CONTRAST: Omnipaque 140 cc    RADIATION (Air Kerma): 737 mGy    FLUOROSCOPY TIME: 21.8 minutes    ESTIMATED BLOOD LOSS: < 50 cc    COMPLICATIONS: None apparent    PROCEDURE OVERVIEW:  The patient was brought to the cardiac catheterization laboratory in the fasting state. The skin over the right wrist was prepped and draped in the usual sterile fashion. Lidocaine infiltration was used to anesthetize the tissue over the right radial artery. Using the micropuncture technique, a 6-Mauritian Glidesheath was inserted in the right radial artery. A 5 Mauritian SolePowerer diagnostic catheter was then advanced over a standard J-wire into the left ventricular cavity where it was gently aspirated, flushed, and then withdrawn across the aortic valve with sequential pressures measured. This catheter was then used to engage the ostium of the left coronary artery and cineangiograms were obtained in multiple projections for complete evaluation of the left coronary system. This catheter was then exchanged over a J-wire to a 6 Mauritian JR4 then a 6-Fr AR1 diagnostic catheter which was eventually used to engage the ostium of the right coronary artery and rightcineangiograms were obtained in  multiple projections for complete evaluation of the right coronary system. Following completion of coronary angiography, we proceeded with PCI of the LAD. See below for more details.     HEMODYNAMICS:  Aortic pressure: 103 /61 mmHg  LVEDP: 6 mmHg  No significant aortic gradient on pullback      CORONARY ANGIOGRAPHY:  The left main coronary artery: Large-caliber vessel, bifurcates to LAD and left circumflex  The left anterior descending coronary artery: Large in caliber transapical vessel with severe 90% mid LAD stenosis around the takeoff of a medium caliber diagonal branch that has severe 80% mid stenosis.  Ectatic proximal LAD.  See PCI details below  The left circumflex coronary artery: Large-caliber vessel with 50% mid stenosis.  Medium caliber OM1 with severe 70% ostial/proximal stenosis.  There also appears to be subtotally occluded OM branch, which appears to be chronic.  The right coronary artery: Large in caliber dominant vessel with 50% proximal stenosis and 30-40% mid stenosis.    PERCUTANEOUS CORONARY INTERVENTION of LAD:  Pre: 90% stenosis and KEVIN III flow  Post: 0% residual stenosis and KEVIN II flow.   Guide Catheter(s): 6 Tristanian EBU 3.75  Guidewire(s): Run-through  Anticoagulation: Heparin to maintain ACT >= 250  Antiplatelet(s): Aspirin, prasugrel  Pre-dilation balloon(s): 3 x 12 mm NC, 4 x 12 mm NC  IVUS or OCT used: IVUS guided revascularization  Intracoronary Atherectomy / Lithotripsy: None  Stent: Synergy 4 x 38 mm LATONYA  Post-dilation balloon(s): 4 x 12 mm NC, 5.5 x 8 mm proximally    After proximal stent optimization with the 5.5 x 8 mm NC balloon given large in caliber proximal LAD based on IVUS imaging, we noticed a perforation which was probably related to heavy arterial calcification after stent expansion.  This was successfully treated and covered with a App [employer] Papyrus 4x15mm stent and post-dilated again using the 4x12mm NC balloon; with good results.    A stat echocardiogram was  performed in the Cath Lab that showed trivial pericardial effusion, patient's systolic blood pressure was 150-160mmHg, with no shortness of breath, with minimal residual chest pain.  He reports feeling comfortable    We will watch the patient in the intensive care unit setting and plan repeat echocardiogram at 6 PM.    Closing: At completion of the procedure the relevant equipment was removed from the body and hemostasis achieved by Radial band for the right radial arteriotomy site.    The patient left the cath lab hemodynamically stable and neurologically intact.      IMPRESSION:  1.  Severe mid LAD disease status post successful IVUS guided PCI Synergy 4 x 38 mm LATONYA (postdilated proximally to 5.5 mm), complicated by a perforation that was successfully treated with a 4x15mm covered stent.  2.  Residual severe CAD in a medium in caliber OM and a medium in caliber diagonal branch, and residual nonobstructive CAD throughout the RCA and the remaining of the circumflex artery.    RECOMMENDATIONS:  ICU monitoring for at least 6 hours  TTE at 6pm - ordered and echo tech aware  HI statin  Recommend optimal medical therapy for his residual CAD  GDMT for secondary ASCVD prevention  Cardiac Rehab referral placed  Given long-term anticoagulation therapy, recommend switching to Plavix 600 mg tomorrow afternoon followed by 75 mg daily, with rivaroxaban 20 mg daily.    NOTIFICATION:  The patient's family (daughter, son-in-law) were notified of the results.    Referring provider - Dr. Hooker - was notified.    Javier Salinas MD, MPH [facility]  Interventional Cardiologist  [facility]   of Clinical Internal Medicine - [facility] Wilfredo VILLAGOMEZ

## 2023-02-27 ENCOUNTER — PHARMACY VISIT (OUTPATIENT)
Dept: PHARMACY | Facility: MEDICAL CENTER | Age: 68
End: 2023-02-27
Payer: MEDICARE

## 2023-02-27 VITALS
WEIGHT: 214.29 LBS | HEIGHT: 70 IN | OXYGEN SATURATION: 90 % | TEMPERATURE: 98.8 F | RESPIRATION RATE: 18 BRPM | HEART RATE: 70 BPM | BODY MASS INDEX: 30.68 KG/M2 | DIASTOLIC BLOOD PRESSURE: 57 MMHG | SYSTOLIC BLOOD PRESSURE: 115 MMHG

## 2023-02-27 PROCEDURE — 700102 HCHG RX REV CODE 250 W/ 637 OVERRIDE(OP): Performed by: INTERNAL MEDICINE

## 2023-02-27 PROCEDURE — 94760 N-INVAS EAR/PLS OXIMETRY 1: CPT

## 2023-02-27 PROCEDURE — 99239 HOSP IP/OBS DSCHRG MGMT >30: CPT | Performed by: INTERNAL MEDICINE

## 2023-02-27 PROCEDURE — A9270 NON-COVERED ITEM OR SERVICE: HCPCS | Performed by: STUDENT IN AN ORGANIZED HEALTH CARE EDUCATION/TRAINING PROGRAM

## 2023-02-27 PROCEDURE — RXMED WILLOW AMBULATORY MEDICATION CHARGE: Performed by: INTERNAL MEDICINE

## 2023-02-27 PROCEDURE — 97161 PT EVAL LOW COMPLEX 20 MIN: CPT

## 2023-02-27 PROCEDURE — 700102 HCHG RX REV CODE 250 W/ 637 OVERRIDE(OP): Performed by: STUDENT IN AN ORGANIZED HEALTH CARE EDUCATION/TRAINING PROGRAM

## 2023-02-27 PROCEDURE — 99232 SBSQ HOSP IP/OBS MODERATE 35: CPT | Performed by: INTERNAL MEDICINE

## 2023-02-27 PROCEDURE — A9270 NON-COVERED ITEM OR SERVICE: HCPCS | Performed by: INTERNAL MEDICINE

## 2023-02-27 RX ORDER — CLOPIDOGREL BISULFATE 75 MG/1
75 TABLET ORAL DAILY
Qty: 60 TABLET | Refills: 0 | Status: SHIPPED | OUTPATIENT
Start: 2023-02-28 | End: 2023-02-27 | Stop reason: SDUPTHER

## 2023-02-27 RX ORDER — ATORVASTATIN CALCIUM 80 MG/1
80 TABLET, FILM COATED ORAL EVERY EVENING
Qty: 60 TABLET | Refills: 0 | Status: SHIPPED | OUTPATIENT
Start: 2023-02-27 | End: 2023-09-01

## 2023-02-27 RX ORDER — COLCHICINE 0.6 MG/1
0.6 TABLET ORAL 2 TIMES DAILY
Qty: 28 TABLET | Refills: 0 | Status: SHIPPED | OUTPATIENT
Start: 2023-02-27 | End: 2023-03-13

## 2023-02-27 RX ORDER — ATORVASTATIN CALCIUM 80 MG/1
80 TABLET, FILM COATED ORAL EVERY EVENING
Qty: 30 TABLET | Refills: 0 | Status: SHIPPED | OUTPATIENT
Start: 2023-02-27 | End: 2023-02-27 | Stop reason: SDUPTHER

## 2023-02-27 RX ORDER — CLOPIDOGREL BISULFATE 75 MG/1
75 TABLET ORAL DAILY
Qty: 60 TABLET | Refills: 0 | Status: SHIPPED | OUTPATIENT
Start: 2023-02-28

## 2023-02-27 RX ADMIN — COLCHICINE 0.6 MG: 0.6 TABLET ORAL at 05:05

## 2023-02-27 RX ADMIN — METOPROLOL TARTRATE 25 MG: 25 TABLET, FILM COATED ORAL at 05:05

## 2023-02-27 RX ADMIN — CLOPIDOGREL BISULFATE 600 MG: 300 TABLET, FILM COATED ORAL at 05:05

## 2023-02-27 RX ADMIN — ACETAMINOPHEN 650 MG: 325 TABLET, FILM COATED ORAL at 07:55

## 2023-02-27 NOTE — ASSESSMENT & PLAN NOTE
As an outpatient is maintained on metformin  A1c on this admission 6.2 which would imply good control  Good candidate for an SGLT2 should he need a second agent for his diabetes or should he demonstrate any decrease in his pump function in the future  Monitor in house with sliding scale insulin  Resume metformin once is clear he is not going to be getting any more dye exposure

## 2023-02-27 NOTE — PROGRESS NOTES
"Cardiology Follow-up Consult Note    Date of Service:    2/27/2023      Consulting Physician: No att. providers found    Name:   Juanito Rodriguez   YOB: 1955  Age:   67 y.o.  male   MRN:   [id number]      Subjective:  No acute events overnight.  Doing well without chest pain, pressure or dyspnea.  Ambulating without any concerning symptoms.      All other review of systems reviewed and negative.      Past medical, surgical, social, and family history reviewed and unchanged from admission except as noted in assessment and plan.    Medications: Reviewed in MAR      No Known Allergies    No intake or output data in the 24 hours ending 02/27/23 1209     Physical Exam  Body mass index is 30.75 kg/m².  /57   Pulse 70   Temp 37.1 °C (98.8 °F) (Temporal)   Resp 18   Ht 1.778 m (5' 10\")   Wt 97.2 kg (214 lb 4.6 oz)   SpO2 90%   Vitals:    02/26/23 2345 02/27/23 0337 02/27/23 0640 02/27/23 0755   BP: 110/74 117/65  115/57   Pulse: 88  62 70   Resp: 16 18  18   Temp: 37.3 °C (99.1 °F) 37.1 °C (98.8 °F)     TempSrc: Temporal Temporal  Temporal   SpO2: (!) 84% 93% 93% 90%   Weight:       Height:         Oxygen Therapy:  Pulse Oximetry: 90 %, O2 (LPM): 0, O2 Delivery Device: None - Room Air    General: No acute distress. Well nourished.  HEENT: EOM grossly intact, no scleral icterus, no pharyngeal erythema.   Neck:  Trachea midline, no visible masses  CVS: RRR. No JVD at 90  Resp: Normal respiratory effort. Normal appearing chest.  Abdomen: Not overtly obese.  MSK/Ext: No clubbing or cyanosis.  Skin: Dry appearing, no rashes.  Neurological: CN III-XII grossly intact. No focal deficits.   Psych: A&O x 3, appropriate affect, good judgement    Labs (personally reviewed and notable for):   Lab Results   Component Value Date/Time    SODIUM 136 02/26/2023 04:22 AM    POTASSIUM 3.8 02/26/2023 04:22 AM    CHLORIDE 105 02/26/2023 04:22 AM    CO2 21 02/26/2023 04:22 AM    GLUCOSE 110 (H) 02/26/2023 04:22 AM "    BUN 23 (H) 02/26/2023 04:22 AM    CREATININE 0.77 02/26/2023 04:22 AM      Lab Results   Component Value Date/Time    WBC 9.9 02/26/2023 04:22 AM    RBC 4.34 (L) 02/26/2023 04:22 AM    HEMOGLOBIN 13.8 (L) 02/26/2023 04:22 AM    HEMATOCRIT 39.5 (L) 02/26/2023 04:22 AM    MCV 91.0 02/26/2023 04:22 AM    MCH 31.8 02/26/2023 04:22 AM    MCHC 34.9 02/26/2023 04:22 AM    MPV 11.1 02/26/2023 04:22 AM    NEUTSPOLYS 72.00 02/24/2023 05:07 PM    LYMPHOCYTES 21.40 (L) 02/24/2023 05:07 PM    MONOCYTES 5.40 02/24/2023 05:07 PM    EOSINOPHILS 0.40 02/24/2023 05:07 PM    BASOPHILS 0.60 02/24/2023 05:07 PM      Lab Results   Component Value Date/Time    CHOLSTRLTOT 178 02/26/2023 04:22 AM     (H) 02/26/2023 04:22 AM    HDL 31 (A) 02/26/2023 04:22 AM    TRIGLYCERIDE 149 02/26/2023 04:22 AM       Lab Results   Component Value Date/Time    TROPONINT 442 (H) 02/25/2023 0625     No results found for: NTPROBNP    Cardiac Imaging and Procedures Review:    ECG: ECG performed 2/26/2023 was interpreted by me which shows sinus rhythm, RBBB,  ms    Echo: Echocardiogram performed on 2/25/2023 was personally interpreted by me which shows normal left ventricular size and systolic function.  Mild MR.  Dilated ascending aorta 4.3 cm    Cardiac catheterization 2/25/2023:  1.  Severe mid LAD disease status post successful IVUS guided PCI Synergy 4 x 38 mm LATONYA (postdilated proximally to 5.5 mm), complicated by a perforation that was successfully treated with a 4x15mm covered stent.  2.  Residual severe CAD in a medium in caliber OM and a medium in caliber diagonal branch, and residual nonobstructive CAD throughout the RCA and the remaining of the circumflex artery.    Radiology test Review:  EC-ECHOCARDIOGRAM LTD W/O CONT   Final Result      EC-ECHOCARDIOGRAM LTD W/O CONT   Final Result      EC-ECHOCARDIOGRAM COMPLETE W/O CONT   Final Result      CL-LEFT HEART CATHETERIZATION WITH POSSIBLE INTERVENTION    (Results Pending)        Problem list:  Principal Problem:    Non-STEMI (non-ST elevated myocardial infarction) (HCC) POA: Yes  Active Problems:    History of pulmonary embolism POA: Unknown    Type 2 diabetes mellitus (HCC) POA: Unknown    Primary hypertension POA: Unknown    EMIGDIO (obstructive sleep apnea) POA: Unknown    History of rectal carcinoma (HCC) POA: Unknown    Pleurodynia POA: Unknown  Resolved Problems:    * No resolved hospital problems. *      Impression and Medical Decision Making:  #NSTEMI  #CAD   #Dressler syndrome  #Pericardial effusion  #Dilated ascending aorta  #Essential hypertension  #Dyslipidemia  #Prediabetes    Recommendations:  -- Patient presented with NSTEMI.  Cardiac catheterization was complicated with perforation of the coronary artery which was fixed with overlapping stents.  Was placed on initial DAPT with aspirin and Effient.  Effient switched to Plavix as he is on oral anticoagulation with Xarelto for history of PE in 2016.  Continue Plavix 75 mg daily and Xarelto 20 mg daily.  -- NSTEMI complicated with Dressler syndrome.  He is on colchicine 0.6 mg daily with trivial pericardial effusion.  Continue for at least 2 weeks.  -- Blood pressure within goal.  Was on antihypertensive prior to hospital admission.  Blood pressure needs to be monitored closely as an outpatient, may need to be reinitiated on antihypertensive medication.  Goal average BP 120s/80s.  --Continue high intensity statin with Lipitor 80 mg daily.  LDL goal less than 70.  -- Patient is stable from a cardiovascular perspective to be discharged home with follow-up in cardiology clinic.    Recommendations discussed with Dr. Arango.    Thank you for allowing me to participate in the care of this patient.  Please contact me with any questions.    Maximino Delvalle M.D.  Cardiologist, [facility] [place]   [phone]    Please note that this dictation was created using voice recognition software. I have made every reasonable  attempt to correct obvious errors, but it is possible there are errors of grammar and possibly content that I did not discover before finalizing the note.

## 2023-02-27 NOTE — CARE PLAN
Problem: Knowledge Deficit - Standard  Goal: Patient and family/care givers will demonstrate understanding of plan of care, disease process/condition, diagnostic tests and medications  Outcome: Progressing     Problem: Optimal Care for the Acute Coronary Syndrome Patient  Goal: Optimal Care for the Acute Coronary Syndrome Patient  Outcome: Progressing  Goal: Potential Interventions for the Acute Coronary Syndrome Patient  Outcome: Progressing     Problem: Optimal Care for the STEMI Patient  Goal: Optimal Care for the STEMI Patient  Outcome: Progressing     Problem: Hemodynamics  Goal: Patient's hemodynamics, fluid balance and neurologic status will be stable or improve  Outcome: Progressing   The patient is Stable - Low risk of patient condition declining or worsening    Shift Goals  Clinical Goals: monitor VS, TR band site,  Patient Goals: SLEEP  Family Goals: none present    Progress made toward(s) clinical / shift goals:  Cath site is soft pink and without signs of hemorrhage, dressing is clean dry and intact.    Patient is not progressing towards the following goals:

## 2023-02-27 NOTE — PROGRESS NOTES
Bedside report received from night RN, pt care assumed, assessment completed. Pt is A&O4, pain 3-provided pain medication per MAR, SR on the monitor. Updated on POC, questions answered. Bed in lowest, locked position, treaded socks on, call light and belongings within reach.

## 2023-02-27 NOTE — DISCHARGE INSTRUCTIONS
Diagnosis:  Acute Coronary Syndrome (ACS) is a diagnosis that encompasses cardiac-related chest pain and heart attack. ACS occurs when the blood flow to the heart muscle is severely reduced or cut off completely due to a slow process called atherosclerosis.  Atherosclerosis is a disease in which the coronary arteries become narrow from a buildup of fat, cholesterol, and other substances that combine to form plaque. If the plaque breaks, a blood clot will form and block the blood flow to the heart muscle. This lack of blood flow can cause damage or death to the heart muscle which is called a heart attack or Myocardial Infarction (MI). There are two different types of MIs:  ST Elevation Myocardial Infarction or STEMI (the most severe type of heart attack) and Non-ST Elevation Myocardial Infarction or NSTEMI.    Treatment Plan:  Cardiac Diet  - Low fat, low salt, low cholesterol   Cardiac Rehab  - Your doctor has ordered you a referral to [facility].  Call [phone] to schedule an appointment.  Attend my follow-up appointment with my Cardiologist.  Take my medications as prescribed by my doctor  Exercise daily  Lower my bad cholesterol and raise my good cholesterol, lower my blood pressure, and Reduce stress    Medications:  Certain medications are used to treat ACS.  Remember to always take medications as prescribed and never stop talking medications unless told by your doctor.    You have been prescribed the following medicatons:    Anti-platelet/blood thinner - Your Anti-platelet/Blood thinning medication is called Plavix and xarelto, and is used in combination with aspirin to prevent clots from forming in your heart and/or around your stent.  Your doctor will determine how long you need to be on this medicine.  Beta-Blocker - Beta-Blocker metoprolol is used to lower blood pressure and heart rate, and/or helps your heart heal after a heart attack.  Statin - Statin atorvastatin is used to lower  cholesterol.    Angiogram, Angioplasty, or Stent Placement  Care After  One of the following procedures was done today.  ANGIOGRAM:  A catheter was placed through the blood vessel in your groin, contrast was injected into the vessels, and pictures were taken.  ANGIOPLASTY:  A catheter was placed through the blood vessel in your groin and directed to an area of blocked blood flow. A balloon, and possibly a metal stent were used to open the blockage. If no other blockages are present below this area, your symptoms should improve. If blockages are present below this area, surgery may still be necessary.  STENT:  A catheter was placed in your groin through which a metal mesh tube was placed in a narrowed part of the artery to facilitate blood flow.  You were given intravenous sedation. These medications are rapidly cleared from your bloodstream. You may feel some discomfort at the insertion site after the local anesthetic wears off. This discomfort should gradually improve over the next several days.  Only take over-the-counter or prescription medicines for pain, discomfort, or fever as directed by your caregiver.  Complications are very uncommon after this procedure. Go to the nearest emergency department if you develop any of the following symptoms:  Worsening pain.  Bleeding.  Swelling at the puncture site.  Lightheadedness.  Dizziness or fainting.  Fever or chills.  If oozing, bleeding, or a lump appears at the puncture site, apply firm pressure directly to the site steadily for 15 minutes and go to the emergency department.  Keep the skin around the insertion site dry. You may take showers after 24 hours. If the area does get wet, dry the skin completely. Avoid baths until the skin puncture site heals, usually 5 to 7 days.  Development of redness, increased soreness, or swelling may be signs of a skin infection. Contact your physician.  Rest for the remainder of the day and avoid any heavy lifting (more than 10  pounds or 4.5 kg). Do not operate heavy machinery, drive, or make legal decisions for the first 24 hours after the procedure. Have a responsible person drive you home.  You may resume your usual diet after the procedure. Avoid alcoholic beverages for 24 hours after the procedure.  Document Released: 12/18/2006 Document Revised: 03/11/2013 Document Reviewed: 10/17/2007  [employer]® Patient Information ©2014 [employer], [employer].

## 2023-02-27 NOTE — PROGRESS NOTES
Assumed care of patient from day shift RN at 1845, Patient AOX4, VSS.   Fall education reinforced, call bell within reach, bed locked and in lowest position. POC discussed and all questions answered.  Purposeful and or hourly rounding in place.

## 2023-02-27 NOTE — PROGRESS NOTES
D/c instructions given, educated on worsening s/s. Pt understands and questions answered. D/c home with friend

## 2023-02-27 NOTE — THERAPY
02/27/23 0801   Interdisciplinary Plan of Care Collaboration   Collaboration Comments Educated pt regarding post ACS/NSTEMI/talk test.  Handout given.  Pt w/ good understanding.

## 2023-02-27 NOTE — ASSESSMENT & PLAN NOTE
Patient's post procedure chest pain is most consistent with pericarditis  Initiate colchicine  Monitor for further signs of ischemia

## 2023-02-27 NOTE — PROGRESS NOTES
4 Eyes Skin Assessment Completed by LINDSEY Acosta and LINDSEY Tatum.    Head WDL  Ears Redness and Blanching  Nose WDL  Mouth WDL  Neck WDL  Breast/Chest WDL  Shoulder Blades Redness and Blanching  Spine Redness and Blanching  (R) Arm/Elbow/Hand Bruising, right radial cath site  (L) Arm/Elbow/Hand WDL  Abdomen WDL  Groin WDL  Scrotum/Coccyx/Buttocks Redness and Blanching  (R) Leg WDL  (L) Leg WDL  (R) Heel/Foot/Toe Redness and Blanching  (L) Heel/Foot/Toe Redness and Blanching          Devices In Places Tele Box and Pulse Ox      Interventions In Place Pillows    Possible Skin Injury No    Pictures Uploaded Into Epic N/A  Wound Consult Placed N/A  RN Wound Prevention Protocol Ordered No

## 2023-02-27 NOTE — ASSESSMENT & PLAN NOTE
Was previously maintained on lisinopril hydrochlorothiazide  In-house has been transitioned to beta-blocker in setting of acute NSTEMI  We will add an ACE inhibitor as pressures allow

## 2023-02-27 NOTE — DISCHARGE PLANNING
Met with pt who said that he lives alone and independent with ADLs and IADLs. He said that his daughter lives just a few miles from him so she can help as needed. His brother lives in [place] who is able to pick him up.     He has a PCP Dr. Bernardo Hairston in [facility].      Care Transition Team Assessment    Information Source  Orientation Level: Oriented X4  Information Given By: Patient  Who is responsible for making decisions for patient? : Patient    Readmission Evaluation  Is this a readmission?: No    Elopement Risk  Legal Hold: No  Ambulatory or Self Mobile in Wheelchair: Yes  Disoriented: No  Psychiatric Symptoms: None  History of Wandering: No  Elopement this Admit: No  Vocalizing Wanting to Leave: No  Displays Behaviors, Body Language Wanting to Leave: No-Not at Risk for Elopement  Elopement Risk: Not at Risk for Elopement    Interdisciplinary Discharge Planning  Does Admitting Nurse Feel This Could be a Complex Discharge?: No  Primary Care Physician: PCP in [facility] with - Patient's Self Care Capacity: Alone and Able to Care For Self  Patient or legal guardian wants to designate a caregiver: No  Support Systems: Friends / Neighbors  Housing / Facility: [address]  Do You Take your Prescribed Medications Regularly: Yes  Able to Return to Previous ADL's: Yes  Mobility Issues: No  Prior Services: None, Home-Independent  Patient Prefers to be Discharged to:: Home  Assistance Needed: No  Durable Medical Equipment: Not Applicable    Discharge Preparedness  What is your plan after discharge?: Home with help  What are your discharge supports?: Child, Other (comment) (brother)  Prior Functional Level: Ambulatory, Drives Self, Independent with Activities of Daily Living, Independent with Medication Management  Difficulity with ADLs: None  Difficulity with IADLs: None    Functional Assesment  Prior Functional Level: Ambulatory, Drives Self, Independent with Activities of Daily Living, Independent with  Medication Management    Finances  Financial Barriers to Discharge: No  Prescription Coverage: Yes    Vision / Hearing Impairment  Vision Impairment : Yes  Right Eye Vision: Wears Glasses, Impaired  Left Eye Vision: Impaired, Wears Glasses  Hearing Impairment : No    Values / Beliefs / Concerns  Values / Beliefs Concerns : No    Advance Directive  Advance Directive?: None    Domestic Abuse  Have you ever been the victim of abuse or violence?: No  Physical Abuse or Sexual Abuse: No  Verbal Abuse or Emotional Abuse: No  Possible Abuse/Neglect Reported to:: Not Applicable         Discharge Risks or Barriers  Discharge risks or barriers?: Other (comment)  Patient risk factors: Other (comment)    Anticipated Discharge Information  Discharge Disposition: Discharged to home/self care (01)

## 2023-02-28 NOTE — DISCHARGE SUMMARY
"Discharge Summary    CHIEF COMPLAINT ON ADMISSION  No chief complaint on file.      Reason for Admission  NSTEMI     Admission Date  2/24/2023    CODE STATUS  FULL    HPI & HOSPITAL COURSE    Per Dr. Doran's H&P dated 2/25/23:  \"Juanito Rodriguez is a 67 y.o. male who presented 2/24/2023 with chest pain that started yesterday afternoon.  Patient has a history of pulmonary embolism for which he takes Xarelto, diabetes, hypertension.  Patient was sitting in time yesterday when he suddenly felt a dull chest pain that slowly became worse.  He took a Xarelto when this occurred and then went to the ED for evaluation.  Denies ever having cardiac catheterization in the past denies drug smoke illicit drug use..     In ED, patient found to have normal vital signs.  Troponin 150.  Chest x-ray negative for acute cardiopulmonary process.  EKG showing normal sinus rhythm with nonspecific T wave changes in the inferior leads and right bundle branch block.  Loaded with aspirin and transferred to [facility] for further care.\"    Patient underwent left heart catheterization on 2/25/23 where they found severe mid LAD disease status post successful IVUS guided PCI LATONYA, complicated by a perforation that was successfully treated with a covered stent.  Residual severe CAD and a medium caliber OM and a medium in caliber diagonal branch and residual nonobstructive CAD throughout the RCA and the remaining of the circumflex artery.  Patient was continued on rivaroxaban 20 mg.  He initially was on prasugrel and aspirin and then switched to Plavix.  Aspirin was stopped.  Cardiology recommending lifelong rivaroxaban 20 mg plus Plavix 75 mg daily given the need for covered stent in his mid LAD.  He was started on metoprolol tartrate 25 mg twice daily.  He was started on atorvastatin 80 mg daily.  He will need annual echo for his ascending aortic dilation.  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  He was started on colchicine 0.6 " mg twice daily x2 weeks.    On day of discharge she was chest pain-free, on room air and tolerating p.o.  Recommend close follow-up with PCP for referral for cardiology (as patient is visiting from out of town).    Therefore, he is discharged in good and stable condition to home with close outpatient follow-up.    The patient met 2-midnight criteria for an inpatient stay at the time of discharge.    Discharge Date  2/27/2023    FOLLOW UP ITEMS POST DISCHARGE  Follow up with PCP back in [place] for referral to cardiology    DISCHARGE DIAGNOSES  Principal Problem:    Non-STEMI (non-ST elevated myocardial infarction) (HCC) POA: Yes  Active Problems:    History of pulmonary embolism POA: Unknown    Type 2 diabetes mellitus (HCC) POA: Unknown    Primary hypertension POA: Unknown    EMIGDIO (obstructive sleep apnea) POA: Unknown    History of rectal carcinoma (HCC) POA: Unknown    Pleurodynia POA: Unknown  Resolved Problems:    * No resolved hospital problems. *      FOLLOW UP  No future appointments.  [facility]  [address]  [address]  [facility] [address] [phone]  Call  Your doctor has referred you for Cardiac Rehab, which is important for your recovery. Please call to make an appointment, or speak with your local doctor to find a program in your area.    your primary care    Follow up in 1 week(s)      Pcp Pt States None            MEDICATIONS ON DISCHARGE     Medication List        START taking these medications        Instructions   atorvastatin 80 MG tablet  Commonly known as: LIPITOR   Take 1 Tablet by mouth every evening.  Dose: 80 mg     clopidogrel 75 MG Tabs  Start taking on: February 28, 2023  Commonly known as: PLAVIX   Take 1 Tablet by mouth every day.  Dose: 75 mg     colchicine 0.6 MG Tabs  Commonly known as: COLCRYS   Take 1 Tablet by mouth 2 times a day for 14 days.  Dose: 0.6 mg     metoprolol tartrate 25 MG Tabs  Commonly known as: LOPRESSOR   Take 1 Tablet by mouth 2  times a day.  Dose: 25 mg            CONTINUE taking these medications        Instructions   metFORMIN 500 MG Tabs  Commonly known as: GLUCOPHAGE   Take 500 mg by mouth 2 times a day with meals.  Dose: 500 mg     rivaroxaban 20 MG Tabs tablet  Commonly known as: Xarelto   Take 1 Tablet by mouth with dinner. Indications: Blockage of Blood Vessel to Lung by a Particle  Dose: 20 mg            STOP taking these medications      lisinopril-hydrochlorothiazide 20-12.5 MG per tablet  Commonly known as: PRINZIDE              Allergies  No Known Allergies    DIET  No orders of the defined types were placed in this encounter.      ACTIVITY  As tolerated.  Weight bearing as tolerated    CONSULTATIONS  Cardiology    PROCEDURES  2/25/23  : Javier Salinas MD, MPH     PROCEDURES PERFORMED:  Left heart catheterization  Coronary angiography  Percutaneous coronary intervention of LAD  Intravascular ultrasound  Moderate conscious sedation       LABORATORY  Lab Results   Component Value Date    SODIUM 136 02/26/2023    POTASSIUM 3.8 02/26/2023    CHLORIDE 105 02/26/2023    CO2 21 02/26/2023    GLUCOSE 110 (H) 02/26/2023    BUN 23 (H) 02/26/2023    CREATININE 0.77 02/26/2023        Lab Results   Component Value Date    WBC 9.9 02/26/2023    HEMOGLOBIN 13.8 (L) 02/26/2023    HEMATOCRIT 39.5 (L) 02/26/2023    PLATELETCT 196 02/26/2023      EC-ECHOCARDIOGRAM COMPLETE W/O CONT  Transthoracic  Echo Report    Echocardiography Laboratory    CONCLUSIONS  No prior study is available for comparison.   Normal left ventricular systolic function.  The left ventricular ejection fraction is visually estimated to be 60%.  Mild mitral regurgitation.  Mild aortic insufficiency.  The ascending aorta is dilated with a diameter of 4.3 cm.    LEVI QUILES  Exam Date:         02/25/2023                      08:28  Exam Location:     Inpatient  Priority:          Routine    Ordering Physician:        CHARLES FAIRBANKS  Referring Physician:        [id number]  Sonographer:               Mendel WEEKS    Age:    67     Gender:    M  MRN:    [id number]  :    1955  BSA:    2.14   Ht (in):    70     Wt (lb):    211  Exam Type:     Complete    Indications:     Acute MI, Chest pain, unspecified  ICD Codes:       410.9  R07.9    CPT Codes:       22729    BP:   112    /   68     HR:   75  Technical Quality:       Fair    MEASUREMENTS  (Male / Female) Normal Values  2D ECHO  LV Diastolic Diameter PLAX        4.7 cm                4.2 - 5.9 / 3.9 - 5.3   cm  LV Systolic Diameter PLAX         3 cm                  2.1 - 4.0 cm  IVS Diastolic Thickness           0.83 cm                 LVPW Diastolic Thickness          0.8 cm                  LVOT Diameter                     2.3 cm                  Estimated LV Ejection Fraction    60 %                    LV Ejection Fraction MOD BP       58.7 %                >= 55  %  LV Ejection Fraction MOD 4C       54.7 %                  LV Ejection Fraction MOD 2C       63.6 %                    DOPPLER  AV Peak Velocity                  1.1 m/s                 AV Peak Gradient                  4.6 mmHg                AV Mean Gradient                  3.2 mmHg                LVOT Peak Velocity                0.9 m/s                 AV Area Cont Eq vti               3.9 cm2                 Mitral E Point Velocity           0.63 m/s                Mitral E to A Ratio               0.72                    MV Pressure Half Time             92.1 ms                 MV Area PHT                       2.4 cm2                 MV Deceleration Time              318 ms                  PV Peak Velocity                  0.87 m/s                PV Peak Gradient                  3 mmHg                  RVOT Peak Velocity                0.57 m/s                  * Indicates values subject to auto-interpretation  LV EF:  60    %    FINDINGS  Left Ventricle  Normal left ventricular chamber size.  Normal left ventricular wall   thickness. Normal left ventricular systolic function. The left   ventricular ejection fraction is visually estimated to be 60%. Normal   regional wall motion. Normal diastolic function.    Right Ventricle  The right ventricle is normal in size and systolic function.    Right Atrium  The right atrium is normal in size. Normal inferior vena cava size and   inspiratory collapse.    Left Atrium  The left atrium is normal in size. Left atrial volume index is 18 mL/sq   m.    Mitral Valve  Structurally normal mitral valve without significant stenosis. Mild   mitral regurgitation.    Aortic Valve  Tricuspid aortic valve. No aortic valve stenosis. Vena contracta is 0.3   cm. Mild aortic insufficiency.    Tricuspid Valve  Structurally normal tricuspid valve without significant stenosis. Trace   tricuspid regurgitation. Unable to estimate right ventricular systolic   pressure due to an inadequate tricuspid regurgitant jet. Right atrial   pressure is estimated to be 3 mmHg.    Pulmonic Valve  The pulmonic valve is not well visualized. No pulmonic stenosis. Trace   pulmonic insufficiency.    Pericardium  No pericardial effusion.    Aorta  Normal aortic root for body surface area. The ascending aorta is   dilated with a diameter of 4.3 cm.    Eduar Hooker M.D.  (Electronically Signed)  Final Date:     25 February 2023                   10:55  Amended:        25 February 2023 20:38  EC-ECHOCARDIOGRAM LTD W/O CONT  Transthoracic  Echo Report    Echocardiography Laboratory    CONCLUSIONS  Prior echocardiogram 2/25/2023, compared to the images of the prior   study, there has been no significant change.   Trivial pericardial effusion. No changes noted from prior study done   earlier today.    LEVI QUILES  Exam Date:         02/25/2023                      18:04  Exam Location:     Inpatient  Priority:          Routine    Ordering Physician:        KENDY ANDRADE                                ([id number])  Referring Physician:       RAYMOND Sharp  Sonographer:               Edna Sim MONSE    Age:    67     Gender:    M  MRN:    [id number]  :    1955  BSA:           Ht (in):           Wt (lb):  Exam Type:     Limited    Indications:     Pericardial Effusion  ICD Codes:       423.9    CPT Codes:       33359    BP:   119    /   72     HR:  Technical Quality:       Fair    MEASUREMENTS  (Male / Female) Normal Values    * Indicates values subject to auto-interpretation  LV EF:        %    FINDINGS  Left Ventricle    Right Ventricle    Right Atrium    Left Atrium    Mitral Valve    Aortic Valve    Tricuspid Valve    Pulmonic Valve    Pericardium  Trivial pericardial effusion. No changes noted from prior study done   earlier today.    Aorta    Eduar Hooker M.D.  (Electronically Signed)  Final Date:     2023                   20:36  EC-ECHOCARDIOGRAM LTD W/O CONT  Transthoracic  Echo Report    Echocardiography Laboratory    CONCLUSIONS  Limited TTE in cath lab after PCI case.  Trivial pericardial effusion.    RODOLFOGELALEVI  Exam Date:         2023                      15:45  Exam Location:     Inpatient  Priority:          Routine    Ordering Physician:        KENDY ANDRADE                               ([id number])  Referring Physician:       RAYMOND Sharp  Sonographer:               Edna Sim MONSE    Age:    67     Gender:    M  MRN:    [id number]  :    1955  BSA:    2.14   Ht (in):    70     Wt (lb):    211  Exam Type:     Limited    Indications:     Cardiac tamponade  ICD Codes:       I31.4    CPT Codes:       49841    BP:   119    /   72     HR:  Technical Quality:       Fair    MEASUREMENTS  (Male / Female) Normal Values    * Indicates values subject to auto-interpretation  LV EF:        %    FINDINGS  Left Ventricle  Preserved systolic function    Right Ventricle    Right Atrium    Left Atrium    Mitral Valve    Aortic Valve    Tricuspid Valve    Pulmonic  Valve    Pericardium  Trivial pericardial effusio (0.2-0.3cm)    Aorta    Javier Salinas MD  (Electronically Signed)  Final Date:     25 February 2023                   16:40        Total time of the discharge process exceeds 33 minutes.

## 2023-03-20 ENCOUNTER — DOCUMENTATION (OUTPATIENT)
Dept: VASCULAR LAB | Facility: MEDICAL CENTER | Age: 68
End: 2023-03-20
Payer: MEDICARE

## 2023-03-20 DIAGNOSIS — E78.5 DYSLIPIDEMIA: ICD-10-CM

## 2023-03-20 DIAGNOSIS — I21.4 NON-STEMI (NON-ST ELEVATED MYOCARDIAL INFARCTION) (HCC): ICD-10-CM

## 2023-03-23 ENCOUNTER — TELEPHONE (OUTPATIENT)
Dept: HEALTH INFORMATION MANAGEMENT | Facility: OTHER | Age: 68
End: 2023-03-23
Payer: MEDICARE

## 2023-09-01 ENCOUNTER — OFFICE VISIT (OUTPATIENT)
Dept: VASCULAR LAB | Facility: MEDICAL CENTER | Age: 68
End: 2023-09-01
Attending: FAMILY MEDICINE
Payer: MEDICARE

## 2023-09-01 VITALS
HEART RATE: 72 BPM | HEIGHT: 70 IN | SYSTOLIC BLOOD PRESSURE: 123 MMHG | DIASTOLIC BLOOD PRESSURE: 70 MMHG | BODY MASS INDEX: 31.31 KG/M2 | WEIGHT: 218.7 LBS

## 2023-09-01 DIAGNOSIS — Z79.01 CHRONIC ANTICOAGULATION: ICD-10-CM

## 2023-09-01 DIAGNOSIS — I10 PRIMARY HYPERTENSION: ICD-10-CM

## 2023-09-01 DIAGNOSIS — E11.9 TYPE 2 DIABETES MELLITUS WITHOUT COMPLICATION, WITHOUT LONG-TERM CURRENT USE OF INSULIN (HCC): ICD-10-CM

## 2023-09-01 DIAGNOSIS — Z86.711 HISTORY OF PULMONARY EMBOLISM: ICD-10-CM

## 2023-09-01 DIAGNOSIS — E78.2 MIXED HYPERLIPIDEMIA: ICD-10-CM

## 2023-09-01 DIAGNOSIS — Z95.5 HISTORY OF CORONARY ANGIOPLASTY WITH INSERTION OF STENT: ICD-10-CM

## 2023-09-01 DIAGNOSIS — I25.2 HISTORY OF NON-ST ELEVATION MYOCARDIAL INFARCTION (NSTEMI): ICD-10-CM

## 2023-09-01 DIAGNOSIS — I25.10 CORONARY ARTERY DISEASE INVOLVING NATIVE CORONARY ARTERY OF NATIVE HEART WITHOUT ANGINA PECTORIS: ICD-10-CM

## 2023-09-01 DIAGNOSIS — G47.33 OSA (OBSTRUCTIVE SLEEP APNEA): ICD-10-CM

## 2023-09-01 PROBLEM — R35.1 BPH ASSOCIATED WITH NOCTURIA: Status: ACTIVE | Noted: 2018-06-06

## 2023-09-01 PROBLEM — N40.1 BPH ASSOCIATED WITH NOCTURIA: Status: ACTIVE | Noted: 2018-06-06

## 2023-09-01 PROBLEM — E29.1 HYPOGONADISM IN MALE: Status: ACTIVE | Noted: 2018-05-30

## 2023-09-01 PROBLEM — E11.69 HYPERLIPIDEMIA ASSOCIATED WITH TYPE 2 DIABETES MELLITUS (HCC): Status: ACTIVE | Noted: 2019-05-14

## 2023-09-01 PROBLEM — E78.5 HYPERLIPIDEMIA ASSOCIATED WITH TYPE 2 DIABETES MELLITUS (HCC): Status: ACTIVE | Noted: 2019-05-14

## 2023-09-01 PROCEDURE — 99204 OFFICE O/P NEW MOD 45 MIN: CPT | Performed by: FAMILY MEDICINE

## 2023-09-01 PROCEDURE — 3078F DIAST BP <80 MM HG: CPT | Performed by: FAMILY MEDICINE

## 2023-09-01 PROCEDURE — 3074F SYST BP LT 130 MM HG: CPT | Performed by: FAMILY MEDICINE

## 2023-09-01 PROCEDURE — 99212 OFFICE O/P EST SF 10 MIN: CPT

## 2023-09-01 RX ORDER — AMLODIPINE BESYLATE 5 MG/1
5 TABLET ORAL DAILY
COMMUNITY
Start: 2023-08-15 | End: 2024-08-14

## 2023-09-01 RX ORDER — DIPHENOXYLATE HYDROCHLORIDE AND ATROPINE SULFATE 2.5; .025 MG/1; MG/1
1 TABLET ORAL DAILY
COMMUNITY

## 2023-09-01 RX ORDER — ROSUVASTATIN CALCIUM 40 MG/1
40 TABLET, COATED ORAL DAILY
Qty: 90 TABLET | Refills: 3 | Status: SHIPPED | OUTPATIENT
Start: 2023-09-01 | End: 2024-08-26

## 2023-09-01 RX ORDER — ROSUVASTATIN CALCIUM 40 MG/1
40 TABLET, COATED ORAL
COMMUNITY
Start: 2023-07-20 | End: 2023-09-01

## 2023-09-01 ASSESSMENT — ENCOUNTER SYMPTOMS
PND: 0
CLAUDICATION: 0
WHEEZING: 0
FEVER: 0
SPUTUM PRODUCTION: 0
ORTHOPNEA: 0
PALPITATIONS: 0
CHILLS: 0
COUGH: 0
SHORTNESS OF BREATH: 0
HEMOPTYSIS: 0

## 2023-09-01 ASSESSMENT — FIBROSIS 4 INDEX: FIB4 SCORE: 1.99

## 2023-09-01 NOTE — PROGRESS NOTES
[facility] - Initial Visit  23     Juanito Rodriguez has been referred for evaluation and management of dyslipidemia  Est 2023    Referral Source: Yesenia Villafuerte A.P.N.     Subjective   Initial visit hx:  on rosuva, but no longer and has ongoing HLD, here to review options   Change in weight: Stable  Body mass index is 31.38 kg/m².  Wt Readings from Last 3 Encounters:   23 99.2 kg (218 lb 11.2 oz)   23 97.2 kg (214 lb 4.6 oz)   23 98.8 kg (217 lb 13 oz)      Exercise habits: moderate regular exercise program  Dietary patterns: intermittent fasting 12h/day, reduced CHO diet   Etoh: No  Reported barriers to care: none   History of ASCVD: Recent ACS within past 12mo, NSTEMI 2023 s/p PCI   Secondary causes of dyslipidemia:  Endocrine/Hypothyroidism:  none reported   Liver disease: none reported   Renal disease/nephrotic syndrome:  none reported  Medications: None  Other Established (non-atherosclerotic) Vascular Disease, if Present: None  Age at Initial Diagnosis of Dyslipidemia: >3yrs     Current Prescription Lipid Lowering Medications - including dose:   Statin: rosuvastatin 40mg daily  Non-Statin: None  Current Lipid Lowering and Related Supplements: None  Any Current Side Effects Potentially Related to Lipid Lowering therapy? No  Current Adherence to Lipid Lowering Therapies: Complete  Previously Attempted Interventions for Lipids - including outcome  Statin: None     Outcome: N/A  Non-Statin: None  Outcome: N/A  Any Previous History of Statin Intolerance? As noted above   Baseline Lipids Prior to Treatment:   Lab Results   Component Value Date/Time    CHOLSTRLTOT 178 2023 04:22 AM     (H) 2023 04:22 AM    HDL 31 (A) 2023 04:22 AM    TRIGLYCERIDE 149 2023 04:22 AM       Anticoagulation/antiplats: Yes, Details: xarelto 20mg  , no hx of bleeding  Hx of PE 2016 - ongoing xarelto - unprovoked    HTN:  No concerns  Home BP los/70s   Adherence  to current HTN meds: compliant all of the time      T2D: No     Patient Active Problem List   Diagnosis    Non-STEMI (non-ST elevated myocardial infarction) (HCC)    Morbid obesity (HCC)    History of pulmonary embolism    Type 2 diabetes mellitus (HCC)    Primary hypertension    EMIGDIO (obstructive sleep apnea)    History of rectal carcinoma (HCC)    Pleurodynia    BPH associated with nocturia    Hyperlipidemia associated with type 2 diabetes mellitus (HCC)    Hypogonadism in male    Pre-diabetes    History of coronary angioplasty with insertion of stent    Coronary artery disease involving native coronary artery of native heart without angina pectoris    History of non-ST elevation myocardial infarction (NSTEMI)    Chronic anticoagulation      has no past surgical history on file.    Current Outpatient Medications:     amLODIPine, 5 mg, Oral, DAILY, Taking    Multi-Vitamins, 1 Tablet, Oral, DAILY, Taking    rosuvastatin, 40 mg, Oral, DAILY    clopidogrel, 75 mg, Oral, DAILY, Taking    rivaroxaban, 20 mg, Oral, PM MEAL, Taking    metoprolol tartrate, 25 mg, Oral, BID (Patient taking differently: 25 mg, Oral, ONCE), Taking    metFORMIN, 500 mg, Oral, BID WITH MEALS, Taking    ALLERGIES: Atorvastatin    History reviewed. No pertinent family history.  Social History     Tobacco Use    Smoking status: Former     Types: Pipe     Quit date:      Years since quittin.6    Smokeless tobacco: Never   Vaping Use    Vaping Use: Never used   Substance Use Topics    Alcohol use: Yes     Comment: occassinally    Drug use: Never       Review of Systems   Constitutional:  Negative for chills, fever and malaise/fatigue.   Respiratory:  Negative for cough, hemoptysis, sputum production, shortness of breath and wheezing.    Cardiovascular:  Negative for chest pain, palpitations, orthopnea, claudication, leg swelling and PND.         Objective    Vitals:    23 1409 23 1415   BP: 137/79 123/70   BP Location: Left arm  "Left arm   Patient Position: Sitting Sitting   BP Cuff Size: Adult Adult   Pulse: 67 72   Weight: 99.2 kg (218 lb 11.2 oz)    Height: 1.778 m (5' 10\")       BP Readings from Last 5 Encounters:   09/01/23 123/70   02/27/23 115/57   02/24/23 136/79     Physical Exam  Constitutional:       Appearance: Normal appearance. He is well-developed.   HENT:      Head: Normocephalic and atraumatic.   Eyes:      Conjunctiva/sclera: Conjunctivae normal.   Neck:      Thyroid: No thyromegaly.      Vascular: No JVD.   Cardiovascular:      Rate and Rhythm: Normal rate and regular rhythm.      Pulses:           Radial pulses are 2+ on the right side and 2+ on the left side.        Dorsalis pedis pulses are 2+ on the right side and 2+ on the left side.        Posterior tibial pulses are 2+ on the right side and 2+ on the left side.      Heart sounds: Normal heart sounds. No murmur heard.     No friction rub. No gallop.   Pulmonary:      Effort: Pulmonary effort is normal. No respiratory distress.      Breath sounds: Normal breath sounds.   Musculoskeletal:      Cervical back: Normal range of motion and neck supple.   Lymphadenopathy:      Cervical: No cervical adenopathy.   Skin:     General: Skin is warm and dry.   Neurological:      General: No focal deficit present.      Mental Status: He is alert and oriented to person, place, and time.      Cranial Nerves: No cranial nerve deficit.     DATA REVIEW:  Most Recent Lipid Panel:   Lab Results   Component Value Date    CHOLSTRLTOT 178 02/26/2023    TRIGLYCERIDE 149 02/26/2023    HDL 31 (A) 02/26/2023     (H) 02/26/2023     Lipid 8/2023   LDL 55    HDL 31      No results found for: \"LIPOPROTA\"   No results found for: \"APOB\"      Other Pertinent Blood Work:   Lab Results   Component Value Date    SODIUM 136 02/26/2023    POTASSIUM 3.8 02/26/2023    CHLORIDE 105 02/26/2023    CO2 21 02/26/2023    ANION 10.0 02/26/2023    GLUCOSE 110 (H) 02/26/2023    BUN 23 (H) " 02/26/2023    CREATININE 0.77 02/26/2023    CALCIUM 8.4 (L) 02/26/2023    ASTSGOT 33 02/24/2023    ALTSGPT 35 02/24/2023    ALKPHOSPHAT 51 02/24/2023    TBILIRUBIN 0.7 02/24/2023    ALBUMIN 4.6 02/24/2023    AGRATIO 1.8 02/24/2023     VASCULAR IMAGING:    Echo 2/2023   Prior echocardiogram 2/25/2023, compared to the images of the prior   study, there has been no significant change.   Trivial pericardial effusion. No changes noted from prior study done   earlier today.    LH cath 2/25/23  1.  Severe mid LAD disease status post successful IVUS guided PCI Synergy 4 x 38 mm LATONYA (postdilated proximally to 5.5 mm), complicated by a perforation that was successfully treated with a 4x15mm covered stent.  2.  Residual severe CAD in a medium in caliber OM and a medium in caliber diagonal branch, and residual nonobstructive CAD throughout the RCA and the remaining of the circumflex artery.        ASSESSMENT AND PLAN  1. Coronary artery disease involving native coronary artery of native heart without angina pectoris  Comp Metabolic Panel    Lipid Profile    Lipoprotein (a)    CBC WITHOUT DIFFERENTIAL    MICROALBUMIN CREAT RATIO URINE    rosuvastatin (CRESTOR) 40 MG tablet      2. History of non-ST elevation myocardial infarction (NSTEMI)        3. History of coronary angioplasty with insertion of stent        4. Hyperlipidemia associated with type 2 diabetes mellitus (HCC)  Comp Metabolic Panel    Lipid Profile    Lipoprotein (a)    CBC WITHOUT DIFFERENTIAL    rosuvastatin (CRESTOR) 40 MG tablet      5. Type 2 diabetes mellitus without complication, without long-term current use of insulin (HCC)  Comp Metabolic Panel    Lipid Profile    Lipoprotein (a)    CBC WITHOUT DIFFERENTIAL    MICROALBUMIN CREAT RATIO URINE    rosuvastatin (CRESTOR) 40 MG tablet      6. Primary hypertension        7. EMIGDIO (obstructive sleep apnea)        8. History of pulmonary embolism        9. Chronic anticoagulation            Patient Type, check all  that apply: Secondary Prevention and Type 2 Diabetes Mellitus    Established Atherosclerotic Cardiovascular Disease (ASCVD): yes    1) CAD, s/p NSTEMI with PCI 2/2023 - stable, no sx   - continue secondary prevention med mgmt, ongoing care with cardiology   - defer plavix use to cardiology recs     Other Established (non-atherosclerotic) Vascular Disease, if Present: yes    1) hx of unprovoked PE 2016 - based upon risk factors recommended for indefinite OAC   - continue xarelto 20mg daily      Evidence of genetic dyslipidemia (eg. Heterozygous Familial Hypercholesterolemia (HeFH)): No   FH genotyping:  not recommended    ACC/AHA Indication for Statin Therapy, jabier all that apply:  Secondary Prevention - Very high risk ASCVD - 2 major events or 1 event with other high-risk conditions    Calculated Risk for ASCVD, if applicable    The ASCVD Risk score (Joseluis TEJADA, et al., 2019) failed to calculate., N/A    Other Significant Risk Markers, if any, jabier all that apply   Other: pending lp(a)    Risk-enhancers: N/A    Goal LDL-C and nonHDL-C based on Clinic Protocol  LDL-C <55 (if HTG, consider non-HDL-C <85, apoB: not defined)   At goal? Yes, 8/2023     LIFESTYLE INTERVENTIONS:    SMOKING:    reports that he quit smoking about 43 years ago. His smoking use included pipe. He has never used smokeless tobacco.   - continued complete avoidance of all tobacco products     PHYSICAL ACTIVITY: continue healthy activity to improve CV fitness.  In general, targeting >150min/week of moderate-level activity or as much as tolerated in light of functional status and co-morbidities     WEIGHT MANAGEMENT AND NUTRITION: Mediterranean style dietary approach  and Provide specific dietary approach handouts      LIPID LOWERING MEDICATION MANAGEMENT:     Statin Therapy Recommendations from Today’s Visit:   - increase rosuvastatin back to 40mg daily     Non-Statin Medications Recommendations from Today’s Visit:   ZETIA: add as next agent  "  NEXLETOL (avoid simva >20, prava >40): not currently indicated   BAS: not currently indicated   OMEGA-3 FAs: not currently indicated   FIBRATE:  not indicated   PCSK9 mAb: not indicated   LEQVIO: not indicated     Indication for PCSK9 Inhibitor strategy, if applicable: Not currently indicated    Supplements Recommended at this visit: None     RECOMMENDATIONS FOR OTHER CV RISK FACTORS:    1) BLOOD PRESSURE MANAGEMENT:  ACC/AHA (2017) goal <130/80  Home BP at goal:  yes  Office BP at goal:  yes  24h ABPM: not ordered to date  RDN candidate? NO  Plan:   Monitoring:   - start/continue home BP monitoring, reviewed correct technique, provide BP log and instructions  - order 24h ABPM:  NO  - monitor lytes/gfr routinely   - contact office if BP consistently >140/>90 for discussion of tx adjustments   Medications:  - consider ARB in future   - continue amlodipine 5mg daily   - continue metoprolol 25mg BID     2) Glycemic Status: Diabetic, T2  Goal A1c < 7.0  Lab Results   Component Value Date    HBA1C 6.5 (H) 08/08/2023    HBA1C 6.2 (H) 02/26/2023      No results found for: \"MICROALBCALC\", \"MALBCRT\", \"MALBEXCR\", \"KETWNX66\", \"MICROALBUR\", \"MICRALB\", \"UMICROALBUM\", \"MICROALBTIM\"  Plan:  - continue current medication plan   - recommmend for routine care with PCP (or endocrine) to include regular A1c monitoring, annual albumin/creatinine ratio (ACR), annual diabetic retinopathy screening, foot exams, annual flu vaccine, and updates to pneumonia vaccines as appropriate      ANTITHROMBOTIC THERAPY plavix 75mg daily, xarelto 20mg daily - continue xarelto indefinitely due to PE   - defer plavix LOT to cardiology though would be reasonable to continue long-term if bleeding risk remains low     OTHER ISSUES:    # EMIGDIO on CPAP  Strongly encouraged CPAP adherence to reduce RV strain, improve overall fatigue symptoms, and improve BP in both the short- and long-term as per HIPARCO (2013) and HIPARCO-2 (2021) studies.   - continue CPAP, " defer mgmt to sleep MD     Studies Ordered at Todays Visit: None   Blood Work Ordered At Today’s visit: as noted above   Follow-Up: 3 months    Tim Marquez M.D.  ABCL, Board-certified clinical lipidologist   [facility]   [facility]   [phone]     CC:  Pcp Pt States None  Yesenia Villafuerte A.P.N.

## 2023-11-27 ENCOUNTER — HOSPITAL ENCOUNTER (OUTPATIENT)
Dept: LAB | Facility: MEDICAL CENTER | Age: 68
End: 2023-11-27
Attending: FAMILY MEDICINE
Payer: MEDICARE

## 2023-11-27 DIAGNOSIS — E11.9 TYPE 2 DIABETES MELLITUS WITHOUT COMPLICATION, WITHOUT LONG-TERM CURRENT USE OF INSULIN (HCC): ICD-10-CM

## 2023-11-27 DIAGNOSIS — I25.10 CORONARY ARTERY DISEASE INVOLVING NATIVE CORONARY ARTERY OF NATIVE HEART WITHOUT ANGINA PECTORIS: ICD-10-CM

## 2023-11-27 LAB
ALBUMIN SERPL BCP-MCNC: 4.6 G/DL (ref 3.2–4.9)
ALBUMIN/GLOB SERPL: 2 G/DL
ALP SERPL-CCNC: 45 U/L (ref 30–99)
ALT SERPL-CCNC: 29 U/L (ref 2–50)
ANION GAP SERPL CALC-SCNC: 7 MMOL/L (ref 7–16)
AST SERPL-CCNC: 21 U/L (ref 12–45)
BILIRUB SERPL-MCNC: 0.9 MG/DL (ref 0.1–1.5)
BUN SERPL-MCNC: 23 MG/DL (ref 8–22)
CALCIUM ALBUM COR SERPL-MCNC: 8.7 MG/DL (ref 8.5–10.5)
CALCIUM SERPL-MCNC: 9.2 MG/DL (ref 8.5–10.5)
CHLORIDE SERPL-SCNC: 106 MMOL/L (ref 96–112)
CHOLEST SERPL-MCNC: 114 MG/DL (ref 100–199)
CO2 SERPL-SCNC: 27 MMOL/L (ref 20–33)
CREAT SERPL-MCNC: 0.88 MG/DL (ref 0.5–1.4)
CREAT UR-MCNC: 205.84 MG/DL
ERYTHROCYTE [DISTWIDTH] IN BLOOD BY AUTOMATED COUNT: 44.2 FL (ref 35.9–50)
FASTING STATUS PATIENT QL REPORTED: NORMAL
GFR SERPLBLD CREATININE-BSD FMLA CKD-EPI: 92 ML/MIN/1.73 M 2
GLOBULIN SER CALC-MCNC: 2.3 G/DL (ref 1.9–3.5)
GLUCOSE SERPL-MCNC: 160 MG/DL (ref 65–99)
HCT VFR BLD AUTO: 45.5 % (ref 42–52)
HDLC SERPL-MCNC: 34 MG/DL
HGB BLD-MCNC: 15.5 G/DL (ref 14–18)
LDLC SERPL CALC-MCNC: 55 MG/DL
MCH RBC QN AUTO: 31.8 PG (ref 27–33)
MCHC RBC AUTO-ENTMCNC: 34.1 G/DL (ref 32.3–36.5)
MCV RBC AUTO: 93.4 FL (ref 81.4–97.8)
MICROALBUMIN UR-MCNC: <1.2 MG/DL
MICROALBUMIN/CREAT UR: NORMAL MG/G (ref 0–30)
PLATELET # BLD AUTO: 213 K/UL (ref 164–446)
PMV BLD AUTO: 11.7 FL (ref 9–12.9)
POTASSIUM SERPL-SCNC: 4.8 MMOL/L (ref 3.6–5.5)
PROT SERPL-MCNC: 6.9 G/DL (ref 6–8.2)
RBC # BLD AUTO: 4.87 M/UL (ref 4.7–6.1)
SODIUM SERPL-SCNC: 140 MMOL/L (ref 135–145)
TRIGL SERPL-MCNC: 124 MG/DL (ref 0–149)
WBC # BLD AUTO: 6.5 K/UL (ref 4.8–10.8)

## 2023-11-27 PROCEDURE — 36415 COLL VENOUS BLD VENIPUNCTURE: CPT

## 2023-11-27 PROCEDURE — 82043 UR ALBUMIN QUANTITATIVE: CPT

## 2023-11-27 PROCEDURE — 80061 LIPID PANEL: CPT

## 2023-11-27 PROCEDURE — 85027 COMPLETE CBC AUTOMATED: CPT

## 2023-11-27 PROCEDURE — 80053 COMPREHEN METABOLIC PANEL: CPT

## 2023-11-27 PROCEDURE — 83695 ASSAY OF LIPOPROTEIN(A): CPT

## 2023-11-27 PROCEDURE — 82570 ASSAY OF URINE CREATININE: CPT

## 2023-11-29 LAB — LPA SERPL-MCNC: 14 MG/DL

## 2023-12-04 ENCOUNTER — OFFICE VISIT (OUTPATIENT)
Dept: VASCULAR LAB | Facility: MEDICAL CENTER | Age: 68
End: 2023-12-04
Attending: FAMILY MEDICINE
Payer: MEDICARE

## 2023-12-04 VITALS
WEIGHT: 221 LBS | HEART RATE: 69 BPM | DIASTOLIC BLOOD PRESSURE: 75 MMHG | HEIGHT: 70 IN | SYSTOLIC BLOOD PRESSURE: 122 MMHG | BODY MASS INDEX: 31.64 KG/M2

## 2023-12-04 DIAGNOSIS — Z95.5 HISTORY OF CORONARY ANGIOPLASTY WITH INSERTION OF STENT: ICD-10-CM

## 2023-12-04 DIAGNOSIS — E11.69 DYSLIPIDEMIA ASSOCIATED WITH TYPE 2 DIABETES MELLITUS (HCC): ICD-10-CM

## 2023-12-04 DIAGNOSIS — E11.69 TYPE 2 DIABETES MELLITUS WITH OTHER SPECIFIED COMPLICATION, WITHOUT LONG-TERM CURRENT USE OF INSULIN (HCC): ICD-10-CM

## 2023-12-04 DIAGNOSIS — T81.718A: ICD-10-CM

## 2023-12-04 DIAGNOSIS — I25.10 CORONARY ARTERY DISEASE INVOLVING NATIVE CORONARY ARTERY OF NATIVE HEART WITHOUT ANGINA PECTORIS: ICD-10-CM

## 2023-12-04 DIAGNOSIS — I25.2 HISTORY OF NON-ST ELEVATION MYOCARDIAL INFARCTION (NSTEMI): ICD-10-CM

## 2023-12-04 DIAGNOSIS — Z79.01 CHRONIC ANTICOAGULATION: Chronic | ICD-10-CM

## 2023-12-04 DIAGNOSIS — I10 PRIMARY HYPERTENSION: ICD-10-CM

## 2023-12-04 DIAGNOSIS — Z86.711 HISTORY OF PULMONARY EMBOLISM: Chronic | ICD-10-CM

## 2023-12-04 DIAGNOSIS — G47.33 OSA (OBSTRUCTIVE SLEEP APNEA): ICD-10-CM

## 2023-12-04 DIAGNOSIS — E66.9 OBESITY (BMI 30-39.9): ICD-10-CM

## 2023-12-04 DIAGNOSIS — E78.5 DYSLIPIDEMIA ASSOCIATED WITH TYPE 2 DIABETES MELLITUS (HCC): ICD-10-CM

## 2023-12-04 PROCEDURE — 3074F SYST BP LT 130 MM HG: CPT | Performed by: FAMILY MEDICINE

## 2023-12-04 PROCEDURE — 99214 OFFICE O/P EST MOD 30 MIN: CPT | Performed by: FAMILY MEDICINE

## 2023-12-04 PROCEDURE — 3078F DIAST BP <80 MM HG: CPT | Performed by: FAMILY MEDICINE

## 2023-12-04 PROCEDURE — 99212 OFFICE O/P EST SF 10 MIN: CPT

## 2023-12-04 RX ORDER — METOPROLOL SUCCINATE 25 MG/1
TABLET, EXTENDED RELEASE ORAL
COMMUNITY
Start: 2023-09-12

## 2023-12-04 ASSESSMENT — ENCOUNTER SYMPTOMS
FEVER: 0
HEMOPTYSIS: 0
BRUISES/BLEEDS EASILY: 0
WHEEZING: 0
ORTHOPNEA: 0
CHILLS: 0
COUGH: 0
PALPITATIONS: 0
BLOOD IN STOOL: 0
CLAUDICATION: 0
SPUTUM PRODUCTION: 0
PND: 0
SHORTNESS OF BREATH: 0

## 2023-12-04 ASSESSMENT — FIBROSIS 4 INDEX: FIB4 SCORE: 1.28

## 2023-12-04 NOTE — PROGRESS NOTES
[facility] - Initial Visit  23     Juanito Rodriguez has been referred for evaluation and management of dyslipidemia  Est 2023    Referral Source: Yesenia Villafuerte A.P.N.     Subjective   Initial visit hx:  last seen 2023, tolerating meds, had labs.  No new concerns.     LIFESTYLE MGMT  Change in weight: increased   Body mass index is 31.71 kg/m².  Wt Readings from Last 3 Encounters:   23 100 kg (221 lb)   23 99.2 kg (218 lb 11.2 oz)   23 97.2 kg (214 lb 4.6 oz)      Exercise habits: moderate regular exercise program - increase walking   Dietary patterns: intermittent fasting 12h/day, reduced CHO diet   Etoh: No  Reported barriers to care: none     MED MGMT   Current Prescription Lipid Lowering Medications - including dose:   Statin: rosuvastatin 40mg daily  Non-Statin: None  Current Lipid Lowering and Related Supplements: None  Any Current Side Effects Potentially Related to Lipid Lowering therapy? No  Current Adherence to Lipid Lowering Therapies: Complete    PERTINENT HLD PMHX  History of ASCVD: Recent ACS within past 12mo, NSTEMI 2023 s/p PCI   Secondary causes of dyslipidemia: none   Other Established (non-atherosclerotic) Vascular Disease, if Present: None  Age at Initial Diagnosis of Dyslipidemia: >3yrs    Previously Attempted Interventions for Lipids - including outcome  Statin: None     Outcome: N/A  Non-Statin: None  Outcome: N/A  Any Previous History of Statin Intolerance? As noted above   Baseline Lipids Prior to Treatment:   Lab Results   Component Value Date/Time    CHOLSTRLTOT 114 2023 08:06 AM    LDL 55 2023 08:06 AM    HDL 34 (A) 2023 08:06 AM    TRIGLYCERIDE 124 2023 08:06 AM       Anticoagulation/antiplats: Yes, Details: xarelto 20mg  , no hx of bleeding  Hx of PE 2016 - ongoing xarelto - unprovoked    HTN:  Home BP los/70s   Adherence to current HTN meds: compliant all of the time       has no past surgical history on  "file.    Current Outpatient Medications:     metoprolol SR, TAKE 1 TABLET (25 MG TOTAL) BY MOUTH DAILY., Taking    amLODIPine, 5 mg, Oral, DAILY, Taking    Multi-Vitamins, 1 Tablet, Oral, DAILY, Taking    rosuvastatin, 40 mg, Oral, DAILY, Taking    clopidogrel, 75 mg, Oral, DAILY, Taking    rivaroxaban, 20 mg, Oral, PM MEAL, Taking    metoprolol tartrate, 25 mg, Oral, BID (Patient taking differently: 25 mg, Oral, ONCE), Taking Differently    metFORMIN, 500 mg, Oral, BID WITH MEALS, Taking    ALLERGIES: Atorvastatin    History reviewed. No pertinent family history.  Social History     Tobacco Use    Smoking status: Former     Types: Pipe     Quit date:      Years since quittin.9    Smokeless tobacco: Never   Vaping Use    Vaping Use: Never used   Substance Use Topics    Alcohol use: Yes     Comment: occassinally    Drug use: Never       Review of Systems   Constitutional:  Negative for chills, fever and malaise/fatigue.   HENT:  Negative for nosebleeds.    Respiratory:  Negative for cough, hemoptysis, sputum production, shortness of breath and wheezing.    Cardiovascular:  Negative for chest pain, palpitations, orthopnea, claudication, leg swelling and PND.   Gastrointestinal:  Negative for blood in stool.   Endo/Heme/Allergies:  Does not bruise/bleed easily.         Objective    Vitals:    23 1428   BP: 122/75   BP Location: Left arm   Patient Position: Sitting   BP Cuff Size: Adult   Pulse: 69   Weight: 100 kg (221 lb)   Height: 1.778 m (5' 10\")      BP Readings from Last 5 Encounters:   23 122/75   23 123/70   23 115/57   23 136/79     Physical Exam  Constitutional:       Appearance: Normal appearance. He is well-developed.   HENT:      Head: Normocephalic and atraumatic.   Eyes:      Conjunctiva/sclera: Conjunctivae normal.   Neck:      Thyroid: No thyromegaly.      Vascular: No JVD.   Cardiovascular:      Rate and Rhythm: Normal rate and regular rhythm.      Pulses:         " "  Radial pulses are 2+ on the right side and 2+ on the left side.        Dorsalis pedis pulses are 2+ on the right side and 2+ on the left side.        Posterior tibial pulses are 2+ on the right side and 2+ on the left side.      Heart sounds: Normal heart sounds. No murmur heard.     No friction rub. No gallop.   Pulmonary:      Effort: Pulmonary effort is normal. No respiratory distress.      Breath sounds: Normal breath sounds.   Musculoskeletal:      Cervical back: Normal range of motion and neck supple.   Lymphadenopathy:      Cervical: No cervical adenopathy.   Skin:     General: Skin is warm and dry.   Neurological:      General: No focal deficit present.      Mental Status: He is alert and oriented to person, place, and time.      Cranial Nerves: No cranial nerve deficit.       DATA REVIEW:  Most Recent Lipid Panel:   Lab Results   Component Value Date    CHOLSTRLTOT 114 11/27/2023    TRIGLYCERIDE 124 11/27/2023    HDL 34 (A) 11/27/2023    LDL 55 11/27/2023     Lipid 8/2023   LDL 55    HDL 31      Lab Results   Component Value Date/Time    LIPOPROTA 14 11/27/2023 08:06 AM      No results found for: \"APOB\"      Other Pertinent Blood Work:   Lab Results   Component Value Date    SODIUM 140 11/27/2023    POTASSIUM 4.8 11/27/2023    CHLORIDE 106 11/27/2023    CO2 27 11/27/2023    ANION 7.0 11/27/2023    GLUCOSE 160 (H) 11/27/2023    BUN 23 (H) 11/27/2023    CREATININE 0.88 11/27/2023    CALCIUM 9.2 11/27/2023    ASTSGOT 21 11/27/2023    ALTSGPT 29 11/27/2023    ALKPHOSPHAT 45 11/27/2023    TBILIRUBIN 0.9 11/27/2023    ALBUMIN 4.6 11/27/2023    AGRATIO 2.0 11/27/2023    LIPOPROTA 14 11/27/2023     VASCULAR IMAGING:    Echo 2/2023   Prior echocardiogram 2/25/2023, compared to the images of the prior   study, there has been no significant change.   Trivial pericardial effusion. No changes noted from prior study done   earlier today.    LH cath 2/25/23  1.  Severe mid LAD disease status post successful " IVUS guided PCI Synergy 4 x 38 mm LATONYA (postdilated proximally to 5.5 mm), complicated by a perforation that was successfully treated with a 4x15mm covered stent.  2.  Residual severe CAD in a medium in caliber OM and a medium in caliber diagonal branch, and residual nonobstructive CAD throughout the RCA and the remaining of the circumflex artery.          ASSESSMENT AND PLAN  1. Dyslipidemia associated with type 2 diabetes mellitus (HCC)  HEMOGLOBIN A1C    CBC WITHOUT DIFFERENTIAL    Comp Metabolic Panel    Lipid Profile    MICROALBUMIN CREAT RATIO URINE      2. Coronary artery disease involving native coronary artery of native heart without angina pectoris        3. History of non-ST elevation myocardial infarction (NSTEMI)        4. History of coronary angioplasty with insertion of stent        5. Type 2 diabetes mellitus without complication, without long-term current use of insulin (HCC)  HEMOGLOBIN A1C    CBC WITHOUT DIFFERENTIAL    Comp Metabolic Panel    Lipid Profile    MICROALBUMIN CREAT RATIO URINE      6. Primary hypertension        7. EMIGDIO (obstructive sleep apnea)        8. History of pulmonary embolism      2016      9. Chronic anticoagulation        10. Perforation of coronary artery      3/2023, resolved with LATONYA placement      11. Obesity (BMI 30-39.9)          Patient Type, check all that apply: Secondary Prevention and Type 2 Diabetes Mellitus    Established Atherosclerotic Cardiovascular Disease (ASCVD): yes    1) CAD, s/p NSTEMI with PCI and stenting LAD x 1 in 2/2023 - stable, no sx   Plan:  - continue secondary prevention med mgmt, ongoing care with cardiology   - defer plavix use to cardiology recs     Other Established (non-atherosclerotic) Vascular Disease, if Present: yes    1) VTE disease  2016 - acute, unprovoked PE - based upon risk factors recommended for indefinite OAC   Plan:  - continue xarelto 20mg daily      Evidence of genetic dyslipidemia (eg. Heterozygous Familial  Hypercholesterolemia (HeFH)): No   FH genotyping:  not recommended    ACC/AHA Indication for Statin Therapy, jabier all that apply:  Secondary Prevention - Very high risk ASCVD - 2 major events or 1 event with other high-risk conditions    Calculated Risk for ASCVD, if applicable    The ASCVD Risk score (Joseluis DK, et al., 2019) failed to calculate., N/A    Other Significant Risk Markers, if any, jabier all that apply   Lp(a) = 14  UACR wnl     Goal LDL-C and nonHDL-C based on Clinic Protocol  LDL-C <55   At goal? Yes, 8/2023     LIFESTYLE INTERVENTIONS:    SMOKING:    reports that he quit smoking about 43 years ago. His smoking use included pipe. He has never used smokeless tobacco.   - continued complete avoidance of all tobacco products     PHYSICAL ACTIVITY: continue healthy activity to improve CV fitness.  In general, targeting >150min/week of moderate-level activity or as much as tolerated in light of functional status and co-morbidities     WEIGHT MANAGEMENT AND NUTRITION: Mediterranean style dietary approach  and Provide specific dietary approach handouts      LIPID LOWERING MEDICATION MANAGEMENT:     Statin Therapy Recommendations from Today’s Visit:   - continue rosuvastatin 40mg daily     Non-Statin Medications Recommendations from Today’s Visit:   ZETIA: add as next agent for additional 18-25% LDL-C reduction if needed     Indication for PCSK9 Inhibitor strategy, if applicable: Not currently indicated    Supplements Recommended at this visit: None     RECOMMENDATIONS FOR OTHER CV RISK FACTORS:    1) BLOOD PRESSURE MANAGEMENT:  ACC/AHA (2017) goal <130/80  Home BP at goal:  yes  Office BP at goal:  yes  24h ABPM: not ordered to date  RDN candidate? NO  Plan:   Monitoring:   - start/continue home BP monitoring, reviewed correct technique, provide BP log and instructions  - order 24h ABPM:  NO  - monitor lytes/gfr routinely   - contact office if BP consistently >140/>90 for discussion of tx adjustments    Medications:  - consider ARB in future   - continue amlodipine 5mg daily   - continue metoprolol 25mg BID     2) Glycemic Status: Diabetic, T2  Goal A1c < 7.0  Lab Results   Component Value Date    HBA1C 6.5 (H) 08/08/2023    HBA1C 6.2 (H) 02/26/2023      Lab Results   Component Value Date/Time    MALBCRT see below 11/27/2023 08:06 AM    MICROALBUR <1.2 11/27/2023 08:06 AM     Plan:  - continue current medication plan   - recommmend for routine care with PCP (or endocrine) to include regular A1c monitoring, annual albumin/creatinine ratio (ACR), annual diabetic retinopathy screening, foot exams, annual flu vaccine, and updates to pneumonia vaccines as appropriate      ANTITHROMBOTIC THERAPY dual pathway inhibition = plavix 75mg daily, xarelto 20mg daily - continue xarelto indefinitely due to PE   - defer plavix LOT to cardiology though would be reasonable to continue long-term if bleeding risk remains low     OTHER ISSUES:    # EMIGDIO on CPAP  Strongly encouraged CPAP adherence to reduce RV strain, improve overall fatigue symptoms, and improve BP in both the short- and long-term as per HIPARCO (2013) and HIPARCO-2 (2021) studies.   - continue CPAP, defer mgmt to sleep MD     Studies Ordered at Todays Visit: None   Blood Work Ordered At Today’s visit: as noted above   Follow-Up: 6mo    Tim Marquez M.D.  ANNA, Board-certified clinical lipidologist   [facility]   [facility]   [phone]     CC:  Yesenia Villafuerte A.P.N.

## 2024-06-04 ENCOUNTER — APPOINTMENT (OUTPATIENT)
Dept: VASCULAR LAB | Facility: MEDICAL CENTER | Age: 69
End: 2024-06-04
Payer: MEDICARE